# Patient Record
Sex: MALE | Race: BLACK OR AFRICAN AMERICAN | NOT HISPANIC OR LATINO | URBAN - METROPOLITAN AREA
[De-identification: names, ages, dates, MRNs, and addresses within clinical notes are randomized per-mention and may not be internally consistent; named-entity substitution may affect disease eponyms.]

---

## 2019-04-30 ENCOUNTER — INPATIENT (INPATIENT)
Facility: HOSPITAL | Age: 84
LOS: 6 days | Discharge: HOME | End: 2019-05-07
Attending: HOSPITALIST | Admitting: HOSPITALIST
Payer: MEDICARE

## 2019-04-30 VITALS
DIASTOLIC BLOOD PRESSURE: 72 MMHG | RESPIRATION RATE: 18 BRPM | OXYGEN SATURATION: 99 % | SYSTOLIC BLOOD PRESSURE: 109 MMHG | HEART RATE: 98 BPM | TEMPERATURE: 99 F

## 2019-04-30 DIAGNOSIS — Z29.9 ENCOUNTER FOR PROPHYLACTIC MEASURES, UNSPECIFIED: ICD-10-CM

## 2019-04-30 DIAGNOSIS — I63.9 CEREBRAL INFARCTION, UNSPECIFIED: ICD-10-CM

## 2019-04-30 DIAGNOSIS — K82.2 PERFORATION OF GALLBLADDER: ICD-10-CM

## 2019-04-30 DIAGNOSIS — K82.8 OTHER SPECIFIED DISEASES OF GALLBLADDER: ICD-10-CM

## 2019-04-30 DIAGNOSIS — I10 ESSENTIAL (PRIMARY) HYPERTENSION: ICD-10-CM

## 2019-04-30 DIAGNOSIS — K81.0 ACUTE CHOLECYSTITIS: ICD-10-CM

## 2019-04-30 LAB
ALBUMIN SERPL ELPH-MCNC: 3.4 G/DL — SIGNIFICANT CHANGE UP (ref 3.3–5)
ALP SERPL-CCNC: 59 U/L — SIGNIFICANT CHANGE UP (ref 40–120)
ALT FLD-CCNC: 25 U/L — SIGNIFICANT CHANGE UP (ref 4–41)
ANION GAP SERPL CALC-SCNC: 19 MMO/L — HIGH (ref 7–14)
ANISOCYTOSIS BLD QL: SLIGHT — SIGNIFICANT CHANGE UP
APPEARANCE UR: CLEAR — SIGNIFICANT CHANGE UP
APTT BLD: 25.1 SEC — LOW (ref 27.5–36.3)
AST SERPL-CCNC: 33 U/L — SIGNIFICANT CHANGE UP (ref 4–40)
BACTERIA # UR AUTO: NEGATIVE — SIGNIFICANT CHANGE UP
BASE EXCESS BLDV CALC-SCNC: -2.4 MMOL/L — SIGNIFICANT CHANGE UP
BASOPHILS # BLD AUTO: 0.02 K/UL — SIGNIFICANT CHANGE UP (ref 0–0.2)
BASOPHILS NFR BLD AUTO: 0.2 % — SIGNIFICANT CHANGE UP (ref 0–2)
BASOPHILS NFR SPEC: 0 % — SIGNIFICANT CHANGE UP (ref 0–2)
BILIRUB SERPL-MCNC: 1.7 MG/DL — HIGH (ref 0.2–1.2)
BILIRUB UR-MCNC: SIGNIFICANT CHANGE UP
BLASTS # FLD: 0 % — SIGNIFICANT CHANGE UP (ref 0–0)
BLD GP AB SCN SERPL QL: NEGATIVE — SIGNIFICANT CHANGE UP
BLOOD GAS VENOUS - CREATININE: 1.02 MG/DL — SIGNIFICANT CHANGE UP (ref 0.5–1.3)
BLOOD UR QL VISUAL: SIGNIFICANT CHANGE UP
BUN SERPL-MCNC: 31 MG/DL — HIGH (ref 7–23)
CALCIUM SERPL-MCNC: 9.3 MG/DL — SIGNIFICANT CHANGE UP (ref 8.4–10.5)
CHLORIDE BLDV-SCNC: 104 MMOL/L — SIGNIFICANT CHANGE UP (ref 96–108)
CHLORIDE SERPL-SCNC: 96 MMOL/L — LOW (ref 98–107)
CO2 SERPL-SCNC: 18 MMOL/L — LOW (ref 22–31)
COLOR SPEC: YELLOW — SIGNIFICANT CHANGE UP
CREAT SERPL-MCNC: 0.94 MG/DL — SIGNIFICANT CHANGE UP (ref 0.5–1.3)
EOSINOPHIL # BLD AUTO: 0 K/UL — SIGNIFICANT CHANGE UP (ref 0–0.5)
EOSINOPHIL NFR BLD AUTO: 0 % — SIGNIFICANT CHANGE UP (ref 0–6)
EOSINOPHIL NFR FLD: 0 % — SIGNIFICANT CHANGE UP (ref 0–6)
GAS PNL BLDV: 130 MMOL/L — LOW (ref 136–146)
GIANT PLATELETS BLD QL SMEAR: PRESENT — SIGNIFICANT CHANGE UP
GLUCOSE BLDV-MCNC: 188 — HIGH (ref 70–99)
GLUCOSE SERPL-MCNC: 191 MG/DL — HIGH (ref 70–99)
GLUCOSE UR-MCNC: NEGATIVE — SIGNIFICANT CHANGE UP
HCO3 BLDV-SCNC: 23 MMOL/L — SIGNIFICANT CHANGE UP (ref 20–27)
HCT VFR BLD CALC: 42.8 % — SIGNIFICANT CHANGE UP (ref 39–50)
HCT VFR BLDV CALC: 45.3 % — SIGNIFICANT CHANGE UP (ref 39–51)
HGB BLD-MCNC: 14.4 G/DL — SIGNIFICANT CHANGE UP (ref 13–17)
HGB BLDV-MCNC: 14.8 G/DL — SIGNIFICANT CHANGE UP (ref 13–17)
HYALINE CASTS # UR AUTO: HIGH
IMM GRANULOCYTES NFR BLD AUTO: 0.4 % — SIGNIFICANT CHANGE UP (ref 0–1.5)
INR BLD: 1.37 — HIGH (ref 0.88–1.17)
KETONES UR-MCNC: HIGH
LACTATE BLDV-MCNC: 2 MMOL/L — SIGNIFICANT CHANGE UP (ref 0.5–2)
LEUKOCYTE ESTERASE UR-ACNC: NEGATIVE — SIGNIFICANT CHANGE UP
LIDOCAIN IGE QN: 9.6 U/L — SIGNIFICANT CHANGE UP (ref 7–60)
LYMPHOCYTES # BLD AUTO: 0.45 K/UL — LOW (ref 1–3.3)
LYMPHOCYTES # BLD AUTO: 4 % — LOW (ref 13–44)
LYMPHOCYTES NFR SPEC AUTO: 0.9 % — LOW (ref 13–44)
MACROCYTES BLD QL: SLIGHT — SIGNIFICANT CHANGE UP
MCHC RBC-ENTMCNC: 30.4 PG — SIGNIFICANT CHANGE UP (ref 27–34)
MCHC RBC-ENTMCNC: 33.6 % — SIGNIFICANT CHANGE UP (ref 32–36)
MCV RBC AUTO: 90.5 FL — SIGNIFICANT CHANGE UP (ref 80–100)
METAMYELOCYTES # FLD: 1.8 % — HIGH (ref 0–1)
MONOCYTES # BLD AUTO: 1.05 K/UL — HIGH (ref 0–0.9)
MONOCYTES NFR BLD AUTO: 9.4 % — SIGNIFICANT CHANGE UP (ref 2–14)
MONOCYTES NFR BLD: 2.7 % — SIGNIFICANT CHANGE UP (ref 2–9)
MYELOCYTES NFR BLD: 0 % — SIGNIFICANT CHANGE UP (ref 0–0)
NEUTROPHIL AB SER-ACNC: 76.1 % — SIGNIFICANT CHANGE UP (ref 43–77)
NEUTROPHILS # BLD AUTO: 9.56 K/UL — HIGH (ref 1.8–7.4)
NEUTROPHILS NFR BLD AUTO: 86 % — HIGH (ref 43–77)
NEUTS BAND # BLD: 15 % — HIGH (ref 0–6)
NITRITE UR-MCNC: NEGATIVE — SIGNIFICANT CHANGE UP
NRBC # BLD: 1 /100WBC — SIGNIFICANT CHANGE UP
NRBC # FLD: 0 K/UL — SIGNIFICANT CHANGE UP (ref 0–0)
OTHER - HEMATOLOGY %: 0 — SIGNIFICANT CHANGE UP
OVALOCYTES BLD QL SMEAR: SLIGHT — SIGNIFICANT CHANGE UP
PCO2 BLDV: 30 MMHG — LOW (ref 41–51)
PH BLDV: 7.46 PH — HIGH (ref 7.32–7.43)
PH UR: 6.5 — SIGNIFICANT CHANGE UP (ref 5–8)
PLATELET # BLD AUTO: 247 K/UL — SIGNIFICANT CHANGE UP (ref 150–400)
PLATELET COUNT - ESTIMATE: NORMAL — SIGNIFICANT CHANGE UP
PMV BLD: 11.5 FL — SIGNIFICANT CHANGE UP (ref 7–13)
PO2 BLDV: 47 MMHG — HIGH (ref 35–40)
POIKILOCYTOSIS BLD QL AUTO: SLIGHT — SIGNIFICANT CHANGE UP
POLYCHROMASIA BLD QL SMEAR: SLIGHT — SIGNIFICANT CHANGE UP
POTASSIUM BLDV-SCNC: 3.8 MMOL/L — SIGNIFICANT CHANGE UP (ref 3.4–4.5)
POTASSIUM SERPL-MCNC: 3.8 MMOL/L — SIGNIFICANT CHANGE UP (ref 3.5–5.3)
POTASSIUM SERPL-SCNC: 3.8 MMOL/L — SIGNIFICANT CHANGE UP (ref 3.5–5.3)
PROMYELOCYTES # FLD: 0 % — SIGNIFICANT CHANGE UP (ref 0–0)
PROT SERPL-MCNC: 6.9 G/DL — SIGNIFICANT CHANGE UP (ref 6–8.3)
PROT UR-MCNC: 200 — HIGH
PROTHROM AB SERPL-ACNC: 15.3 SEC — HIGH (ref 9.8–13.1)
RBC # BLD: 4.73 M/UL — SIGNIFICANT CHANGE UP (ref 4.2–5.8)
RBC # FLD: 12.7 % — SIGNIFICANT CHANGE UP (ref 10.3–14.5)
RBC CASTS # UR COMP ASSIST: HIGH (ref 0–?)
REVIEW TO FOLLOW: YES — SIGNIFICANT CHANGE UP
RH IG SCN BLD-IMP: POSITIVE — SIGNIFICANT CHANGE UP
SAO2 % BLDV: 81.3 % — SIGNIFICANT CHANGE UP (ref 60–85)
SODIUM SERPL-SCNC: 133 MMOL/L — LOW (ref 135–145)
SP GR SPEC: > 1.04 — HIGH (ref 1–1.04)
SQUAMOUS # UR AUTO: SIGNIFICANT CHANGE UP
UROBILINOGEN FLD QL: HIGH
VARIANT LYMPHS # BLD: 3.5 % — SIGNIFICANT CHANGE UP
WBC # BLD: 11.13 K/UL — HIGH (ref 3.8–10.5)
WBC # FLD AUTO: 11.13 K/UL — HIGH (ref 3.8–10.5)
WBC UR QL: HIGH (ref 0–?)

## 2019-04-30 PROCEDURE — 76700 US EXAM ABDOM COMPLETE: CPT | Mod: 26

## 2019-04-30 PROCEDURE — 99223 1ST HOSP IP/OBS HIGH 75: CPT | Mod: GC,AI

## 2019-04-30 PROCEDURE — 74177 CT ABD & PELVIS W/CONTRAST: CPT | Mod: 26

## 2019-04-30 RX ORDER — HYDROMORPHONE HYDROCHLORIDE 2 MG/ML
0.25 INJECTION INTRAMUSCULAR; INTRAVENOUS; SUBCUTANEOUS EVERY 4 HOURS
Qty: 0 | Refills: 0 | Status: DISCONTINUED | OUTPATIENT
Start: 2019-04-30 | End: 2019-05-04

## 2019-04-30 RX ORDER — SODIUM CHLORIDE 9 MG/ML
500 INJECTION INTRAMUSCULAR; INTRAVENOUS; SUBCUTANEOUS ONCE
Qty: 0 | Refills: 0 | Status: COMPLETED | OUTPATIENT
Start: 2019-04-30 | End: 2019-04-30

## 2019-04-30 RX ORDER — PIPERACILLIN AND TAZOBACTAM 4; .5 G/20ML; G/20ML
3.38 INJECTION, POWDER, LYOPHILIZED, FOR SOLUTION INTRAVENOUS ONCE
Qty: 0 | Refills: 0 | Status: COMPLETED | OUTPATIENT
Start: 2019-04-30 | End: 2019-04-30

## 2019-04-30 RX ORDER — PIPERACILLIN AND TAZOBACTAM 4; .5 G/20ML; G/20ML
3.38 INJECTION, POWDER, LYOPHILIZED, FOR SOLUTION INTRAVENOUS EVERY 8 HOURS
Qty: 0 | Refills: 0 | Status: DISCONTINUED | OUTPATIENT
Start: 2019-04-30 | End: 2019-05-06

## 2019-04-30 RX ORDER — ATORVASTATIN CALCIUM 80 MG/1
10 TABLET, FILM COATED ORAL AT BEDTIME
Qty: 0 | Refills: 0 | Status: DISCONTINUED | OUTPATIENT
Start: 2019-04-30 | End: 2019-05-07

## 2019-04-30 RX ADMIN — PIPERACILLIN AND TAZOBACTAM 25 GRAM(S): 4; .5 INJECTION, POWDER, LYOPHILIZED, FOR SOLUTION INTRAVENOUS at 15:54

## 2019-04-30 RX ADMIN — SODIUM CHLORIDE 500 MILLILITER(S): 9 INJECTION INTRAMUSCULAR; INTRAVENOUS; SUBCUTANEOUS at 09:30

## 2019-04-30 RX ADMIN — SODIUM CHLORIDE 1000 MILLILITER(S): 9 INJECTION INTRAMUSCULAR; INTRAVENOUS; SUBCUTANEOUS at 07:21

## 2019-04-30 RX ADMIN — PIPERACILLIN AND TAZOBACTAM 200 GRAM(S): 4; .5 INJECTION, POWDER, LYOPHILIZED, FOR SOLUTION INTRAVENOUS at 09:18

## 2019-04-30 RX ADMIN — SODIUM CHLORIDE 500 MILLILITER(S): 9 INJECTION INTRAMUSCULAR; INTRAVENOUS; SUBCUTANEOUS at 14:24

## 2019-04-30 RX ADMIN — PIPERACILLIN AND TAZOBACTAM 3.38 GRAM(S): 4; .5 INJECTION, POWDER, LYOPHILIZED, FOR SOLUTION INTRAVENOUS at 01:00

## 2019-04-30 RX ADMIN — ATORVASTATIN CALCIUM 10 MILLIGRAM(S): 80 TABLET, FILM COATED ORAL at 22:40

## 2019-04-30 RX ADMIN — SODIUM CHLORIDE 500 MILLILITER(S): 9 INJECTION INTRAMUSCULAR; INTRAVENOUS; SUBCUTANEOUS at 09:32

## 2019-04-30 NOTE — ED ADULT TRIAGE NOTE - CHIEF COMPLAINT QUOTE
Pt st" I been vomiting and episode just alittle diarreah, but I have terrible pain in lower abd mostly rt side and seems worse when I lay down at night can't sleep going on for 4 days now." localizing pain to rt lower abd area. Pt st" I been vomiting and episode just alittle diarreah, but I have terrible pain in lower abd mostly rt side and seems worse when I lay down at night can't sleep going on for 4 days now." localizing pain to rt lower abd area. hx of htn, pacemaker/ on eloquis. denies bloody emesis. "Im just dry heaving now nothing is coming up."

## 2019-04-30 NOTE — H&P ADULT - PROBLEM SELECTOR PLAN 3
H/o CVA on Eliquis per daughter; suspect thromboembolic etiology  - EKG to check for afib (irregular HR on exam)  - hold Eliquis for possible IR biopsy

## 2019-04-30 NOTE — H&P ADULT - PROBLEM SELECTOR PLAN 1
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 Given likelihood of metastatic disease and pt currently well appearing, will hold off surgery and pursue IR biopsy/oncology workup  - surgery will continue to follow  - IR consulted for biopsy- GB vs omental mets

## 2019-04-30 NOTE — H&P ADULT - NSHPPHYSICALEXAM_GEN_ALL_CORE
General: WN/WD NAD, pleasant elderly man, [place]  Neurology: limited as pt is [place]; grossly no deficits  Eyes: PERRLA, EOMI  ENT/Neck: Neck supple, trachea midline, No JVD  Respiratory: CTA B/L, No wheezing, rales, rhonchi  CV: irregular, S1S2, no murmurs, rubs or gallops  Abdominal: Soft, ND, RUQ mildly TTP with guarding upon deep palpation, no rebound, +BS   Extremities: No edema, + peripheral pulses  Skin: No Rashes, Hematoma, Ecchymosis

## 2019-04-30 NOTE — ED ADULT NURSE NOTE - CHIEF COMPLAINT QUOTE
Pt st" I been vomiting and episode just alittle diarreah, but I have terrible pain in lower abd mostly rt side and seems worse when I lay down at night can't sleep going on for 4 days now." localizing pain to rt lower abd area. hx of htn, pacemaker/ on eloquis. denies bloody emesis. "Im just dry heaving now nothing is coming up."

## 2019-04-30 NOTE — ED PROVIDER NOTE - ATTENDING CONTRIBUTION TO CARE
Afebrile. Awake and Alert. Lungs CTA. Heart RRR. Abdomen soft, RLQ TTP, ND. CN II-XII grossly intact. Moves all extremities without lateralization.    CT a/p r/o appy  UA r/o UTI Afebrile. Awake and Alert. Lungs CTA. Heart RRR. Abdomen soft, RUQ>RLQ TTP, ND. CN II-XII grossly intact. Moves all extremities without lateralization.    RUQ US r/o cholecystitis  CT a/p r/o appy  UA r/o UTI

## 2019-04-30 NOTE — H&P ADULT - PROBLEM SELECTOR PLAN 5
VTE ppx- SCDs for now given possible GB perforation pending IR eval  Diet- keep NPO today pending IR eval  GOC- pt defers decisions to his daughter Mireille Delatorre [phone]. Daughter believes pt will want to be DNR/I but would want surgical/invasive tx/workup if indicated. Keep pt full code for now, daughter will clarify with pt and other family members today.

## 2019-04-30 NOTE — ED PROVIDER NOTE - OBJECTIVE STATEMENT
89M h/o HTN, hyperlipidemia,  CVA ( 10yrs ago without any residual effects ), CAD, pacemaker, p/w 3 days of RLQ pain, waxing and waning, worse at night. States he had nausea and vomiting x2 the first day. No blood. Reports decreased PO intake. Denies fevers, chills, chest pain. States pain is 5/10 now. Had a BM yesterday. Denies abdominal surgeries

## 2019-04-30 NOTE — ED PROVIDER NOTE - PHYSICAL EXAMINATION
CONSTITUTIONAL: NAD, awake, alert  HEAD: Normocephalic; atraumatic  ENMT: External appears normal; dry MM  CARD: Normal Sl, S2; no audible murmurs  RESP: Breathing comfortably on RA, normal wob, lungs ctab  ABD: Soft, non-distended; + RLQ tenderness  EXT: No edema, normal ROM in all four extremities  NEURO: aaox3, moving all extremities spontaneously

## 2019-04-30 NOTE — ED ADULT NURSE NOTE - NSIMPLEMENTINTERV_GEN_ALL_ED
Implemented All Universal Safety Interventions:  [place] to call system. Call bell, personal items and telephone within reach. Instruct patient to call for assistance. Room bathroom lighting operational. Non-slip footwear when patient is off stretcher. Physically safe environment: no spills, clutter or unnecessary equipment. Stretcher in lowest position, wheels locked, appropriate side rails in place.

## 2019-04-30 NOTE — H&P ADULT - NSICDXPASTMEDICALHX_GEN_ALL_CORE_FT
PAST MEDICAL HISTORY:  Bradycardia     CVA (Cerebral Infarction) No residual signs on exam    GERD (Gastroesophageal Reflux Disease)     History of Carotid Stenosis     Hypercholesteremia     Hypertension

## 2019-04-30 NOTE — H&P ADULT - NSHPSOCIALHISTORY_GEN_ALL_CORE
Remote history of smoking (greater than 60 years ago). Occassionally drinks wine. Lives with his daughter. Remote history of smoking (greater than 60 years ago). Occasionally drinks wine. Lives with his daughter. [place]. Independent in most ADLs; travels to [place] by himself on occasion.

## 2019-04-30 NOTE — ED ADULT NURSE REASSESSMENT NOTE - NS ED NURSE REASSESS COMMENT FT1
pt denies pain. IV abx infusing. pt is awaiting a bed. awaiting further orders Will continue to monitor the pt safety maintained

## 2019-04-30 NOTE — ED ADULT NURSE NOTE - OBJECTIVE STATEMENT
Received pt to room 14. pt is alert and oriented x 4, ambulatory ,c/o abdominal pain and nausea and diarrhea since Sunday. Daughter is at bedside. pt is not nauseous or vomiting now. but still has pain. Pt is able to ambulate without assistance as per daughter. Pt appears to be comfortable at this time. labs sent as per order. 20 G Sl placed in the left forearm and IVF infusing as per order. Pt awaits CT and ultrasound.

## 2019-04-30 NOTE — ED PROVIDER NOTE - PMH
Bradycardia    CVA (Cerebral Infarction)  No residual signs on exam  GERD (Gastroesophageal Reflux Disease)    History of Carotid Stenosis    Hypercholesteremia    Hypertension

## 2019-04-30 NOTE — H&P ADULT - PROBLEM SELECTOR PLAN 2
RUQ concerning for acute cholecystitis. Afebrile and mild leukocytosis, though with 15% bandemia, lactate 2.0  - Discussed at length with surgery- given likelihood of metastatic disease and current well appearance, will defer surgical management for now. Will continue to follow  - IVF bolus, repeat lactate in AM  - will treat empirically for acute efren with zosyn (started 4/30)  - low threshold to notify surgery if pt having worsening pain, hemodynamic instability

## 2019-04-30 NOTE — H&P ADULT - NSHPLABSRESULTS_GEN_ALL_CORE
14.4   11.13 )-----------( 247      ( 30 Apr 2019 07:20 )             42.8       04-30    133<L>  |  96<L>  |  31<H>  ----------------------------<  191<H>  3.8   |  18<L>  |  0.94    Ca    9.3      30 Apr 2019 07:20    TPro  6.9  /  Alb  3.4  /  TBili  1.7<H>  /  DBili  x   /  AST  33  /  ALT  25  /  AlkPhos  59  04-30              Urinalysis Basic - ( 30 Apr 2019 09:38 )    Color: YELLOW / Appearance: CLEAR / SG: > 1.040 / pH: 6.5  Gluc: NEGATIVE / Ketone: MODERATE  / Bili: TRACE / Urobili: SMALL   Blood: SMALL / Protein: 200 / Nitrite: NEGATIVE   Leuk Esterase: NEGATIVE / RBC: 11-25 / WBC 11-25   Sq Epi: FEW / Non Sq Epi: x / Bacteria: NEGATIVE        PT/INR - ( 30 Apr 2019 09:30 )   PT: 15.3 SEC;   INR: 1.37          PTT - ( 30 Apr 2019 09:30 )  PTT:25.1 SEC    Lactate Trend            CAPILLARY BLOOD GLUCOSE      POCT Blood Glucose.: 173 mg/dL (30 Apr 2019 06:13)

## 2019-04-30 NOTE — H&P ADULT - HISTORY OF PRESENT ILLNESS
HPI: 89 yo man with HTN, HLD, prior CVA, sinus node dysfunction s/p PPM presenting from home with RUQ abdominal pain. He developed nausea two days prior to presentation, then subsequently developed RUQ pain that worsened overnight. He denies fever, chills or diarrhea. He has never had pain like this previously. He does not have an appetite today, but usually he has a good appetite and denies recent weight loss. His last colonoscopy was greater than a decade ago.     In the ED, VS: Tm 98.6F, HR 90-100s, BP 91/55, SpO2 98% on RA. Labs notable for mild leukocytosis 11, bands 15%, lactate 2.0, pH 7.46, bicarb 18. Received 500cc bolus x2. Pt underwent CT A/P with IV contrast, which showed findings suspicious for gall bladder mass with signs of perforation, extensive necrotic RP lymphadenopathy, omental nodularity, and acute cholecystitis. RUQ also showed findings suspicious for acute cholecystitis. Surgery was consulted and recommended monitoring off antibiotics 89 yo man with HTN, HLD, prior CVA (likely afib as pt is on Eliquis 2.5), sinus node dysfunction s/p PPM presenting from home with RUQ abdominal pain. Pt is very [place] (hearing aids currently removed), and defers interview to his daughter, Mireille Delatorre [phone], from whom history was obtained over the phone. Pt developed nausea two days prior to presentation, then subsequently developed RUQ pain that worsened overnight. Denies fever, chills or diarrhea. He has never had pain like this previously. He does not have an appetite today, but usually he has a good appetite and denies recent weight loss. His last colonoscopy was greater than a decade ago. Pt lives with his daughter Mireille and is very independent in his ADLs. He travels to [place] by bus by himself. Daughter believes pt would want to be a DNR/I while on the floor, but would rescind it temporarily for surgery/procedure, which they would agree to if indicated; she will clarify code status with him and family today.    In the ED, VS: Tm 98.6F, HR 90-100s, BP 91/55, SpO2 98% on RA. Labs notable for mild leukocytosis 11, bands 15%, lactate 2.0, pH 7.46, bicarb 18. Received 500cc bolus x2 and zosyn x1. Pt underwent CT A/P with IV contrast, which showed findings suspicious for gall bladder mass with signs of perforation, extensive necrotic RP lymphadenopathy, omental nodularity, and acute cholecystitis. RUQ also showed findings suspicious for acute cholecystitis. Surgery was consulted and recommended monitoring off antibiotics, and less invasive oncology workup.

## 2019-04-30 NOTE — ED PROVIDER NOTE - NS ED ROS FT
General: denies fever, chills  CV: denies chest pain, palpitations  Resp: denies difficulty breathing, cough  Abdominal: +nausea, +vomiting, +abdominal pain  MSK: denies muscle aches, bony pain, leg pain, leg swelling  Neuro: denies headaches, numbness, tingling, dizziness, lightheadedness.  Skin: denies rashes, cuts, bruises  Hematologic: denies unexplained bruises

## 2019-04-30 NOTE — H&P ADULT - NSICDXPASTSURGICALHX_GEN_ALL_CORE_FT
PAST SURGICAL HISTORY:  History of Knee Surgery Left knee in 1970's    IH (Inguinal Hernia) Left    Pacemaker     Pacemaker

## 2019-04-30 NOTE — CHART NOTE - NSCHARTNOTEFT_GEN_A_CORE
IR consulted for biopsy of gall bladder mass vs omental carcinomatosis per surgery's recommendations. Discussed with IR resident Dr. Nick Madera, who reviewed images with IR attending and advised that the imaging shows omental thickening without an optimal target for biopsy. There is a likely necrotic lymphadenopathy in series 2 images 41-43 that may be amenable to endoscopic ultrasound-guided biopsy, recommended advanced GI eval. Will discuss with advanced GI.     Juan David Duffy MD  Internal medicine resident- team 1  [id number] IR consulted for biopsy of gall bladder mass vs omental carcinomatosis per surgery's recommendations. Discussed with IR resident Dr. Nick Madera, who reviewed images with IR attending and advised that the imaging shows omental thickening without an optimal target for biopsy. There is a likely necrotic lymphadenopathy in series 2 images 41-43 that may be amenable to endoscopic ultrasound-guided biopsy, recommended advanced GI eval. Advanced GI consult placed.     Juan David Duffy MD  Internal medicine resident- team 1  [id number]

## 2019-04-30 NOTE — H&P ADULT - NEGATIVE GASTROINTESTINAL SYMPTOMS
no hematochezia/no constipation/no vomiting/no melena/no jaundice/no diarrhea/no change in bowel habits

## 2019-04-30 NOTE — ED PROVIDER NOTE - CLINICAL SUMMARY MEDICAL DECISION MAKING FREE TEXT BOX
89M w/ RLQ pain and tenderness, CT, IVF, labs, denies pain medications right now. Considered ischemic bowel but patient is in 5/10 pain, will check lactate on vbg. no h/o abdominal surgeries previously, low suspicion for SBO

## 2019-05-01 ENCOUNTER — TRANSCRIPTION ENCOUNTER (OUTPATIENT)
Age: 84
End: 2019-05-01

## 2019-05-01 LAB
ALBUMIN SERPL ELPH-MCNC: 2.7 G/DL — LOW (ref 3.3–5)
ALP SERPL-CCNC: 58 U/L — SIGNIFICANT CHANGE UP (ref 40–120)
ALT FLD-CCNC: 41 U/L — SIGNIFICANT CHANGE UP (ref 4–41)
ANION GAP SERPL CALC-SCNC: 18 MMO/L — HIGH (ref 7–14)
APTT BLD: 27.8 SEC — SIGNIFICANT CHANGE UP (ref 27.5–36.3)
AST SERPL-CCNC: 55 U/L — HIGH (ref 4–40)
BASOPHILS # BLD AUTO: 0.03 K/UL — SIGNIFICANT CHANGE UP (ref 0–0.2)
BASOPHILS NFR BLD AUTO: 0.2 % — SIGNIFICANT CHANGE UP (ref 0–2)
BILIRUB SERPL-MCNC: 1.1 MG/DL — SIGNIFICANT CHANGE UP (ref 0.2–1.2)
BUN SERPL-MCNC: 34 MG/DL — HIGH (ref 7–23)
CALCIUM SERPL-MCNC: 8.5 MG/DL — SIGNIFICANT CHANGE UP (ref 8.4–10.5)
CANCER AG125 SERPL-ACNC: 19 U/ML — SIGNIFICANT CHANGE UP
CANCER AG19-9 SERPL-ACNC: 27 U/ML — SIGNIFICANT CHANGE UP
CANCER AG27-29 SERPL-ACNC: 17.7 U/ML — SIGNIFICANT CHANGE UP (ref 0–38.6)
CEA SERPL-MCNC: 2 NG/ML — SIGNIFICANT CHANGE UP (ref 1–3.8)
CHLORIDE SERPL-SCNC: 109 MMOL/L — HIGH (ref 98–107)
CO2 SERPL-SCNC: 17 MMOL/L — LOW (ref 22–31)
CREAT SERPL-MCNC: 1.02 MG/DL — SIGNIFICANT CHANGE UP (ref 0.5–1.3)
EOSINOPHIL # BLD AUTO: 0.01 K/UL — SIGNIFICANT CHANGE UP (ref 0–0.5)
EOSINOPHIL NFR BLD AUTO: 0.1 % — SIGNIFICANT CHANGE UP (ref 0–6)
GLUCOSE SERPL-MCNC: 139 MG/DL — HIGH (ref 70–99)
HCT VFR BLD CALC: 40.8 % — SIGNIFICANT CHANGE UP (ref 39–50)
HGB BLD-MCNC: 13.5 G/DL — SIGNIFICANT CHANGE UP (ref 13–17)
IMM GRANULOCYTES NFR BLD AUTO: 0.6 % — SIGNIFICANT CHANGE UP (ref 0–1.5)
INR BLD: 1.3 — HIGH (ref 0.88–1.17)
LACTATE SERPL-SCNC: 1.1 MMOL/L — SIGNIFICANT CHANGE UP (ref 0.5–2)
LYMPHOCYTES # BLD AUTO: 0.81 K/UL — LOW (ref 1–3.3)
LYMPHOCYTES # BLD AUTO: 5.9 % — LOW (ref 13–44)
MCHC RBC-ENTMCNC: 30.6 PG — SIGNIFICANT CHANGE UP (ref 27–34)
MCHC RBC-ENTMCNC: 33.1 % — SIGNIFICANT CHANGE UP (ref 32–36)
MCV RBC AUTO: 92.5 FL — SIGNIFICANT CHANGE UP (ref 80–100)
MONOCYTES # BLD AUTO: 1.55 K/UL — HIGH (ref 0–0.9)
MONOCYTES NFR BLD AUTO: 11.2 % — SIGNIFICANT CHANGE UP (ref 2–14)
NEUTROPHILS # BLD AUTO: 11.33 K/UL — HIGH (ref 1.8–7.4)
NEUTROPHILS NFR BLD AUTO: 82 % — HIGH (ref 43–77)
NRBC # FLD: 0 K/UL — SIGNIFICANT CHANGE UP (ref 0–0)
PLATELET # BLD AUTO: 282 K/UL — SIGNIFICANT CHANGE UP (ref 150–400)
PMV BLD: 11.8 FL — SIGNIFICANT CHANGE UP (ref 7–13)
POTASSIUM SERPL-MCNC: 3.7 MMOL/L — SIGNIFICANT CHANGE UP (ref 3.5–5.3)
POTASSIUM SERPL-SCNC: 3.7 MMOL/L — SIGNIFICANT CHANGE UP (ref 3.5–5.3)
PROT SERPL-MCNC: 6 G/DL — SIGNIFICANT CHANGE UP (ref 6–8.3)
PROTHROM AB SERPL-ACNC: 14.9 SEC — HIGH (ref 9.8–13.1)
RBC # BLD: 4.41 M/UL — SIGNIFICANT CHANGE UP (ref 4.2–5.8)
RBC # FLD: 12.9 % — SIGNIFICANT CHANGE UP (ref 10.3–14.5)
SODIUM SERPL-SCNC: 144 MMOL/L — SIGNIFICANT CHANGE UP (ref 135–145)
SPECIMEN SOURCE: SIGNIFICANT CHANGE UP
WBC # BLD: 13.81 K/UL — HIGH (ref 3.8–10.5)
WBC # FLD AUTO: 13.81 K/UL — HIGH (ref 3.8–10.5)

## 2019-05-01 PROCEDURE — 99497 ADVNCD CARE PLAN 30 MIN: CPT

## 2019-05-01 PROCEDURE — 99233 SBSQ HOSP IP/OBS HIGH 50: CPT | Mod: GC

## 2019-05-01 RX ADMIN — HYDROMORPHONE HYDROCHLORIDE 0.25 MILLIGRAM(S): 2 INJECTION INTRAMUSCULAR; INTRAVENOUS; SUBCUTANEOUS at 08:47

## 2019-05-01 RX ADMIN — PIPERACILLIN AND TAZOBACTAM 25 GRAM(S): 4; .5 INJECTION, POWDER, LYOPHILIZED, FOR SOLUTION INTRAVENOUS at 00:46

## 2019-05-01 RX ADMIN — HYDROMORPHONE HYDROCHLORIDE 0.25 MILLIGRAM(S): 2 INJECTION INTRAMUSCULAR; INTRAVENOUS; SUBCUTANEOUS at 08:32

## 2019-05-01 RX ADMIN — ATORVASTATIN CALCIUM 10 MILLIGRAM(S): 80 TABLET, FILM COATED ORAL at 21:49

## 2019-05-01 RX ADMIN — PIPERACILLIN AND TAZOBACTAM 25 GRAM(S): 4; .5 INJECTION, POWDER, LYOPHILIZED, FOR SOLUTION INTRAVENOUS at 08:32

## 2019-05-01 RX ADMIN — PIPERACILLIN AND TAZOBACTAM 25 GRAM(S): 4; .5 INJECTION, POWDER, LYOPHILIZED, FOR SOLUTION INTRAVENOUS at 16:17

## 2019-05-01 NOTE — PROGRESS NOTE ADULT - PROBLEM SELECTOR PLAN 1
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 Given likelihood of metastatic disease and pt currently well appearing, will hold off surgery and pursue IR biopsy/oncology workup  - surgery will continue to follow  - IR consulted for biopsy and recommends advanced GI eval for a likely necrotic lymphadenopathy in series 2 images 41-43 that may be amenable to endoscopic ultrasound-guided biopsy  - advanced GI consult placed

## 2019-05-01 NOTE — DISCHARGE NOTE PROVIDER - CARE PROVIDERS DIRECT ADDRESSES
,DirectAddress_Unknown ,DirectAddress_Unknown,[email or web address]

## 2019-05-01 NOTE — DISCHARGE NOTE PROVIDER - CARE PROVIDER_API CALL
Efren Johnston)  Critical Care Medicine; Internal Medicine; Pulmonary Disease  [address] [place] [address]  Phone: [phone]  Fax: [phone]  Follow Up Time: 2 weeks Efren Johnston)  Critical Care Medicine; Internal Medicine; Pulmonary Disease  [address] [place] [address]  Phone: [phone]  Fax: [phone]  Follow Up Time: 2 weeks    Guevara Tamayo)  Diagnostic Radiology; VascularIntervent Radiology  [address] [place] [address]  Phone: [phone]  Fax: [phone]  Follow Up Time: Routine

## 2019-05-01 NOTE — DISCHARGE NOTE PROVIDER - INSTRUCTIONS
No restrictions, please eat a soft diet if you do not have dentures. With dentures, you can eat regular food.

## 2019-05-01 NOTE — PROGRESS NOTE ADULT - PROBLEM SELECTOR PLAN 3
H/o CVA on Eliquis per daughter; suspect thromboembolic etiology  - EKG to check for afib (irregular HR on exam)  - hold Eliquis for possible biopsy

## 2019-05-01 NOTE — PROGRESS NOTE ADULT - ASSESSMENT
89 yo man in otherwise good health presenting with nausea, RUQ abdominal pain and CT findings concerning for a possible perforated gallbladder neoplasm. Patient well-appearing, without evidence of sepsis.     - No surgical intervention recommended  - IR not planning procedure  - Defer further workup to primary team    [id number]  B Team Surgery

## 2019-05-01 NOTE — DISCHARGE NOTE PROVIDER - PROVIDER TOKENS
PROVIDER:[TOKEN:[[id number]],FOLLOWUP:[2 weeks]] PROVIDER:[TOKEN:[[id number]],FOLLOWUP:[2 weeks]],PROVIDER:[TOKEN:[[id number]],FOLLOWUP:[Routine]]

## 2019-05-01 NOTE — PROGRESS NOTE ADULT - SUBJECTIVE AND OBJECTIVE BOX
CHIEF COMPLAINT: Patient is a 89y old  Male who presents with a chief complaint of RUQ pain (30 Apr 2019 13:23)      SUBJECTIVE / OVERNIGHT EVENTS: No acute events overnight. Pt seen and examined at bedside.      MEDICATIONS:  MEDICATIONS  (STANDING):  atorvastatin 10 milliGRAM(s) Oral at bedtime  piperacillin/tazobactam IVPB. 3.375 Gram(s) IV Intermittent every 8 hours    MEDICATIONS  (PRN):  HYDROmorphone  Injectable 0.25 milliGRAM(s) IV Push every 4 hours PRN Severe Pain (7 - 10)        OBJECTIVE:  Vital Signs Last 24 Hrs  T(C): 36.6 (01 May 2019 05:18), Max: 36.8 (30 Apr 2019 10:59)  T(F): 97.8 (01 May 2019 05:18), Max: 98.3 (30 Apr 2019 12:42)  HR: 83 (01 May 2019 05:18) (83 - 100)  BP: 108/55 (01 May 2019 05:18) (91/55 - 108/55)  BP(mean): --  RR: 18 (01 May 2019 05:18) (16 - 18)  SpO2: 96% (01 May 2019 05:18) (94% - 100%)  CAPILLARY BLOOD GLUCOSE      POCT Blood Glucose.: 136 mg/dL (30 Apr 2019 15:20)    I&O's Summary        PHYSICAL EXAM:  GENERAL: NAD, pleasant elderly man, [place]  HEENT: EOMI, PERRLA, conjunctiva and sclera clear  NECK: Supple, No JVD  CHEST/LUNG: CTABL  HEART: RRR; S1 S2, No murmurs, rubs, or gallops  ABDOMEN: Soft, mildly TTP RUQ, Nondistended; BS x4   EXTREMITIES:  2+ Pulses, No edema  NEUROLOGY: AAOx3  SKIN: No rashes or lesions    LABS:                        14.4   11.13 )-----------( 247      ( 30 Apr 2019 07:20 )             42.8     04-30    133<L>  |  96<L>  |  31<H>  ----------------------------<  191<H>  3.8   |  18<L>  |  0.94    Ca    9.3      30 Apr 2019 07:20    TPro  6.9  /  Alb  3.4  /  TBili  1.7<H>  /  DBili  x   /  AST  33  /  ALT  25  /  AlkPhos  59  04-30    PT/INR - ( 30 Apr 2019 09:30 )   PT: 15.3 SEC;   INR: 1.37          PTT - ( 30 Apr 2019 09:30 )  PTT:25.1 SEC      Urinalysis Basic - ( 30 Apr 2019 09:38 )    Color: YELLOW / Appearance: CLEAR / SG: > 1.040 / pH: 6.5  Gluc: NEGATIVE / Ketone: MODERATE  / Bili: TRACE / Urobili: SMALL   Blood: SMALL / Protein: 200 / Nitrite: NEGATIVE   Leuk Esterase: NEGATIVE / RBC: 11-25 / WBC 11-25   Sq Epi: FEW / Non Sq Epi: x / Bacteria: NEGATIVE CHIEF COMPLAINT: Patient is a 89y old  Male who presents with a chief complaint of RUQ pain (30 Apr 2019 13:23)      SUBJECTIVE / OVERNIGHT EVENTS: No acute events overnight. Pt seen and examined at bedside. Pt still does not have his hearing aids- daughter to  bring them in today. Endorses pain-- I advised pt by writing to ask the nurse for prn pain meds if in pain.       MEDICATIONS:  MEDICATIONS  (STANDING):  atorvastatin 10 milliGRAM(s) Oral at bedtime  piperacillin/tazobactam IVPB. 3.375 Gram(s) IV Intermittent every 8 hours    MEDICATIONS  (PRN):  HYDROmorphone  Injectable 0.25 milliGRAM(s) IV Push every 4 hours PRN Severe Pain (7 - 10)        OBJECTIVE:  Vital Signs Last 24 Hrs  T(C): 36.6 (01 May 2019 05:18), Max: 36.8 (30 Apr 2019 10:59)  T(F): 97.8 (01 May 2019 05:18), Max: 98.3 (30 Apr 2019 12:42)  HR: 83 (01 May 2019 05:18) (83 - 100)  BP: 108/55 (01 May 2019 05:18) (91/55 - 108/55)  BP(mean): --  RR: 18 (01 May 2019 05:18) (16 - 18)  SpO2: 96% (01 May 2019 05:18) (94% - 100%)  CAPILLARY BLOOD GLUCOSE      POCT Blood Glucose.: 136 mg/dL (30 Apr 2019 15:20)    I&O's Summary        PHYSICAL EXAM:  GENERAL: NAD, pleasant elderly man, [place]  HEENT: EOMI, PERRLA, conjunctiva and sclera clear  NECK: Supple, No JVD  CHEST/LUNG: CTABL  HEART: RRR; S1 S2, No murmurs, rubs, or gallops  ABDOMEN: Soft, TTP RUQ, no rebound, Nondistended; BS x4   EXTREMITIES:  2+ Pulses, No edema  NEUROLOGY: AAOx3  SKIN: No rashes or lesions    LABS:                        14.4   11.13 )-----------( 247      ( 30 Apr 2019 07:20 )             42.8     04-30    133<L>  |  96<L>  |  31<H>  ----------------------------<  191<H>  3.8   |  18<L>  |  0.94    Ca    9.3      30 Apr 2019 07:20    TPro  6.9  /  Alb  3.4  /  TBili  1.7<H>  /  DBili  x   /  AST  33  /  ALT  25  /  AlkPhos  59  04-30    PT/INR - ( 30 Apr 2019 09:30 )   PT: 15.3 SEC;   INR: 1.37          PTT - ( 30 Apr 2019 09:30 )  PTT:25.1 SEC      Urinalysis Basic - ( 30 Apr 2019 09:38 )    Color: YELLOW / Appearance: CLEAR / SG: > 1.040 / pH: 6.5  Gluc: NEGATIVE / Ketone: MODERATE  / Bili: TRACE / Urobili: SMALL   Blood: SMALL / Protein: 200 / Nitrite: NEGATIVE   Leuk Esterase: NEGATIVE / RBC: 11-25 / WBC 11-25   Sq Epi: FEW / Non Sq Epi: x / Bacteria: NEGATIVE

## 2019-05-01 NOTE — GOALS OF CARE CONVERSATION - PERSONAL ADVANCE DIRECTIVE - TREATMENT GUIDELINE COMMENT
All medical and surgical interventions as deemed appropriate/indicated. DNR/I, but if surgery is indicated would temporarily rescind the DNR/I for surgery. All medical interventions as deemed appropriate/indicated, including IV fluids and antibiotics. DNR/I. Will not pursue more invasive workup such as surgery, or toxic medical tx including chemotherapy if it would not be curative. Goal is maintaining an independent level of functioning and quality of life.

## 2019-05-01 NOTE — DISCHARGE NOTE PROVIDER - NSDCCPCAREPLAN_GEN_ALL_CORE_FT
PRINCIPAL DISCHARGE DIAGNOSIS  Diagnosis: Gallbladder mass  Assessment and Plan of Treatment: You have a gallbladder mass with perforation (leaking), which is suspicious for cancer. This mass and leaking is likely to cause worsening infection or pain, and the hospice care network is available 24/7 for assistance with symptoms. Please contact the hospice care network if needed for worsening symptoms, in order to maintain your comfort. PRINCIPAL DISCHARGE DIAGNOSIS  Diagnosis: Gallbladder mass  Assessment and Plan of Treatment: You have a gallbladder mass with perforation (leaking), which is suspicious for cancer. This mass and leaking is likely to cause worsening infection or pain, and the hospice care network is available 24/7 for assistance with symptoms. Please contact the hospice care network if needed for worsening symptoms, in order to maintain your comfort.  For the biliary drain, please change dressing (regular gauze or ones specially designed for a drain) daily. If you note blood or fluid on the gauze that concerns you, please call your nurse for help. Empty the bag daily, and more frequently as needed if the bag is full. Please have the drain exchanged no more than 3 months from placement (exchange by 8/3/19) at which point they may consider removing the drain. You will likely need to come back to the hospital to speak with interventional radiology to coordinate the drain change.  The family was trained on how to care for the biliary drain.  Please take pain medication as needed if you have abdominal pain.

## 2019-05-01 NOTE — DISCHARGE NOTE PROVIDER - HOSPITAL COURSE
89 yo man with HTN, HLD, prior CVA (likely afib as pt is on Eliquis 2.5; no residual deficits), sinus node dysfunction s/p PPM presenting from home with RUQ abdominal pain.        In the ED, VS: Tm 98.6F, HR 90-100s, BP 91/55, SpO2 98% on RA. Labs notable for mild leukocytosis 11, bands 15%, lactate 2.0, pH 7.46, bicarb 18. Received 500cc bolus x2 and zosyn x1. Pt underwent CT A/P with IV contrast, which showed findings suspicious for gall bladder mass with signs of perforation, extensive necrotic RP lymphadenopathy, omental nodularity, and acute cholecystitis. RUQ also showed findings suspicious for acute cholecystitis. Surgery was consulted and recommended monitoring off antibiotics, and less invasive oncology workup given the likelihood of metastatic disease. IR was also consulted and recommended endoscopic biopsy. After discussing the likely diagnosis of metastatic cancer. pt and his family opted for hospice eval. Pt's goal was to remain independent for the remainder of his life and to maintain high quality of life. Referred to hospice care network. Pt made himself DNR/I. 89 yo man with HTN, HLD, prior CVA (likely afib as pt is on Eliquis 2.5; no residual deficits), sinus node dysfunction s/p PPM presenting from home with RUQ abdominal pain.        In the ED, VS: Tm 98.6F, HR 90-100s, BP 91/55, SpO2 98% on RA. Labs notable for mild leukocytosis 11, bands 15%, lactate 2.0, pH 7.46, bicarb 18. Received 500cc bolus x2 and zosyn x1. Pt underwent CT A/P with IV contrast, which showed findings suspicious for gall bladder mass with signs of perforation, extensive necrotic RP lymphadenopathy, omental nodularity, and acute cholecystitis. RUQ also showed findings suspicious for acute cholecystitis. Surgery was consulted and recommended monitoring off antibiotics, and less invasive oncology workup given the likelihood of metastatic disease. IR was also consulted and recommended endoscopic biopsy. After discussing the likely diagnosis of metastatic cancer. pt and his family opted for hospice eval. Pt's goal was to remain independent for the remainder of his life and to maintain high quality of life. Referred to hospice care network and after discussions pt was planned for home hospice. Pt made himself DNR/I. Discussed risks and benefits of eliquis as well as a statin and patient deferred decision to family and medical team. 89 yo man with HTN, HLD, prior CVA (likely afib as pt is on Eliquis 2.5; no residual deficits), sinus node dysfunction s/p PPM presenting from home with RUQ abdominal pain.        In the ED, VS: Tm 98.6F, HR 90-100s, BP 91/55, SpO2 98% on RA. Labs notable for mild leukocytosis 11, bands 15%, lactate 2.0, pH 7.46, bicarb 18. Received 500cc bolus x2 and zosyn x1. Pt underwent CT A/P with IV contrast, which showed findings suspicious for gall bladder mass with signs of perforation, extensive necrotic RP lymphadenopathy, omental nodularity, and acute cholecystitis. RUQ also showed findings suspicious for acute cholecystitis. Surgery was consulted and recommended antibiotics, and less invasive oncology workup given the likelihood of metastatic disease. IR was also consulted and recommended endoscopic biopsy. After discussing the likely diagnosis of metastatic cancer. pt and his family opted for hospice eval. Pt's goal was to remain independent for the remainder of his life and to maintain high quality of life. Referred to hospice care network and after discussions pt was planned for home hospice. Pt made himself DNR/I. Discussed risks and benefits of eliquis as well as a statin and patient deferred decision to family and medical team. will continue with these medications for now. Patient underwent IR-guided cholecystostomy tube placement for possible acute cholecystitis as per ID recommendations. He will complete 13 more days of abx with Augmentin upon discharge as per ID recommendations. He should f/u with IR in 3 months or as needed for evaluation of cholecystotomy tube. 89 yo man with HTN, HLD, prior CVA (likely afib as pt is on Eliquis 2.5; no residual deficits), sinus node dysfunction s/p PPM presenting from home with RUQ abdominal pain.        In the ED, VS: Tm 98.6F, HR 90-100s, BP 91/55, SpO2 98% on RA. Labs notable for mild leukocytosis 11, bands 15%, lactate 2.0, pH 7.46, bicarb 18. Received 500cc bolus x2 and zosyn x1. Pt underwent CT A/P with IV contrast, which showed findings suspicious for gall bladder mass with signs of perforation, extensive necrotic RP lymphadenopathy, omental nodularity, and acute cholecystitis. RUQ also showed findings suspicious for acute cholecystitis. Surgery was consulted and recommended antibiotics, and less invasive oncology workup given the likelihood of metastatic disease. IR was also consulted and recommended endoscopic biopsy. After discussing the likely diagnosis of metastatic cancer. pt and his family opted for hospice eval. Pt's goal was to remain independent for the remainder of his life and to maintain high quality of life. Referred to hospice care network and after discussions pt was planned for home hospice. Pt made himself DNR/I. Discussed risks and benefits of eliquis as well as a statin and patient deferred decision to family and medical team. will continue with these medications for now. Patient underwent IR-guided cholecystostomy tube placement for possible acute cholecystitis as per ID recommendations. He will complete 13 more days of abx with Augmentin upon discharge as per ID recommendations. He should f/u with IR in 3 months or as needed for evaluation of cholecystotomy tube and possible removal.

## 2019-05-01 NOTE — PROGRESS NOTE ADULT - SUBJECTIVE AND OBJECTIVE BOX
Pt seen and examined. No acute interval events.    Vital Signs (24 Hrs):  T(C): 36.6 (05-01-19 @ 05:18), Max: 36.8 (04-30-19 @ 10:59)  HR: 78 (05-01-19 @ 08:30) (78 - 97)  BP: 114/96 (05-01-19 @ 08:30) (95/51 - 114/96)  RR: 18 (05-01-19 @ 05:18) (16 - 18)  SpO2: 96% (05-01-19 @ 05:18) (94% - 100%)  Wt(kg): --  Daily Height in cm: 165.1 (30 Apr 2019 19:33)    Daily     I&O's Summary    General: well developed, well nourished, NAD  Neuro: alert and oriented, no focal deficits, moves all extremities spontaneously  HEENT: NCAT, EOMI, anicteric, mucosa moist. Hard of hearing.   Respiratory: airway patent, respirations unlabored  CVS: regular rate and rhythm  Abdomen: soft, moderate RUQ tenderness, nondistended. No abdominal scars.   Extremities: no edema, sensation and movement grossly intact  Skin: warm, dry, appropriate color                          13.5   13.81 )-----------( 282      ( 01 May 2019 06:30 )             40.8       05-01    144  |  109<H>  |  34<H>  ----------------------------<  139<H>  3.7   |  17<L>  |  1.02    Ca    8.5      01 May 2019 06:30    TPro  6.0  /  Alb  2.7<L>  /  TBili  1.1  /  DBili  x   /  AST  55<H>  /  ALT  41  /  AlkPhos  58  05-01      PT/INR - ( 01 May 2019 06:30 )   PT: 14.9 SEC;   INR: 1.30          PTT - ( 01 May 2019 06:30 )  PTT:27.8 SEC              Urinalysis Basic - ( 30 Apr 2019 09:38 )    Color: YELLOW / Appearance: CLEAR / SG: > 1.040 / pH: 6.5  Gluc: NEGATIVE / Ketone: MODERATE  / Bili: TRACE / Urobili: SMALL   Blood: SMALL / Protein: 200 / Nitrite: NEGATIVE   Leuk Esterase: NEGATIVE / RBC: 11-25 / WBC 11-25   Sq Epi: FEW / Non Sq Epi: x / Bacteria: NEGATIVE

## 2019-05-01 NOTE — GOALS OF CARE CONVERSATION - PERSONAL ADVANCE DIRECTIVE - CONVERSATION DETAILS
[facility]: Spoke with dtr. ,Mireille, via phone and explained hospice services. Pt. defers to dtr. she will meet with liaison tomorrow at 11:00. Vinicius Burks RN

## 2019-05-01 NOTE — PROGRESS NOTE ADULT - ASSESSMENT
89 yo man with HTN, HLD, prior CVA (likely afib as pt is on Eliquis 2.5), sinus node dysfunction s/p PPM presenting from home with RUQ abdominal pain. 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 Also RUQ US notable for possible acute cholecystitis, surgery recommending medical management and oncologic workup.

## 2019-05-01 NOTE — PROGRESS NOTE ADULT - PROBLEM SELECTOR PLAN 5
VTE ppx- SCDs for now given possible GB perforation pending IR eval  Diet- keep NPO today pending IR eval  GOC- pt defers decisions to his daughter Mireille Delatorre [phone]. Daughter believes pt will want to be DNR/I but would want surgical/invasive tx/workup if indicated. Keep pt full code for now, daughter will clarify with pt and other family members today.

## 2019-05-01 NOTE — GOALS OF CARE CONVERSATION - PERSONAL ADVANCE DIRECTIVE - STAFF/PROVIDER
Juan David Duffy MD- internal medicine (team 1) resident Juan David Duffy MD and Lucy Zepeda MD- internal medicine (team 1) residents

## 2019-05-01 NOTE — PROGRESS NOTE ADULT - ATTENDING COMMENTS
advanced care planning- GOC discussed with pt and her 2 daughters Mireille and Matilde  at the bedside (pt is AAOx3 but defers all medical decision making to his daughters)- pt and daughters understand based on the imaging pt likely has metastatic cancer that is treatable but not curable.  Both daughters states that goal is to keep pt comfortable and to avoid any invasive procedures (inc. biopsy) that are not curative. Both daughters state they would not want their father to received any cancer therapy, inc. chemo, radiation, surgery, as these therapies will not provide cure but will likely decrease pt's quality of life. pt is made DNI/DNR. will refer to hospice care   total face-to-face time spent on advanced care planning 15min advance care planning- GOC discussed with pt and her 2 daughters Mireille and Matilde  at the bedside (pt is AAOx3 but defers all medical decision making to his daughters)- pt and daughters understand based on the imaging pt likely has metastatic cancer that is treatable but not curable.  Both daughters states that goal is to keep pt comfortable and to avoid any invasive procedures (inc. biopsy) that are not curative. Both daughters state they would not want their father to received any cancer therapy, inc. chemo, radiation, surgery, as these therapies will not provide cure but will likely decrease pt's quality of life. pt is made DNI/DNR. will refer to hospice care   total face-to-face time spent on advance care planning 20min

## 2019-05-02 LAB
ANION GAP SERPL CALC-SCNC: 14 MMO/L — SIGNIFICANT CHANGE UP (ref 7–14)
BACTERIA UR CULT: SIGNIFICANT CHANGE UP
BUN SERPL-MCNC: 22 MG/DL — SIGNIFICANT CHANGE UP (ref 7–23)
CALCIUM SERPL-MCNC: 8.5 MG/DL — SIGNIFICANT CHANGE UP (ref 8.4–10.5)
CHLORIDE SERPL-SCNC: 100 MMOL/L — SIGNIFICANT CHANGE UP (ref 98–107)
CO2 SERPL-SCNC: 19 MMOL/L — LOW (ref 22–31)
CREAT SERPL-MCNC: 0.88 MG/DL — SIGNIFICANT CHANGE UP (ref 0.5–1.3)
GLUCOSE SERPL-MCNC: 137 MG/DL — HIGH (ref 70–99)
HCT VFR BLD CALC: 43.1 % — SIGNIFICANT CHANGE UP (ref 39–50)
HGB BLD-MCNC: 14.6 G/DL — SIGNIFICANT CHANGE UP (ref 13–17)
MAGNESIUM SERPL-MCNC: 2.1 MG/DL — SIGNIFICANT CHANGE UP (ref 1.6–2.6)
MCHC RBC-ENTMCNC: 30.5 PG — SIGNIFICANT CHANGE UP (ref 27–34)
MCHC RBC-ENTMCNC: 33.9 % — SIGNIFICANT CHANGE UP (ref 32–36)
MCV RBC AUTO: 90.2 FL — SIGNIFICANT CHANGE UP (ref 80–100)
NRBC # FLD: 0 K/UL — SIGNIFICANT CHANGE UP (ref 0–0)
PHOSPHATE SERPL-MCNC: 2.5 MG/DL — SIGNIFICANT CHANGE UP (ref 2.5–4.5)
PLATELET # BLD AUTO: 306 K/UL — SIGNIFICANT CHANGE UP (ref 150–400)
PMV BLD: 11.3 FL — SIGNIFICANT CHANGE UP (ref 7–13)
POTASSIUM SERPL-MCNC: 3.2 MMOL/L — LOW (ref 3.5–5.3)
POTASSIUM SERPL-SCNC: 3.2 MMOL/L — LOW (ref 3.5–5.3)
RBC # BLD: 4.78 M/UL — SIGNIFICANT CHANGE UP (ref 4.2–5.8)
RBC # FLD: 13.1 % — SIGNIFICANT CHANGE UP (ref 10.3–14.5)
SODIUM SERPL-SCNC: 133 MMOL/L — LOW (ref 135–145)
WBC # BLD: 17.93 K/UL — HIGH (ref 3.8–10.5)
WBC # FLD AUTO: 17.93 K/UL — HIGH (ref 3.8–10.5)

## 2019-05-02 PROCEDURE — 99222 1ST HOSP IP/OBS MODERATE 55: CPT

## 2019-05-02 PROCEDURE — 99233 SBSQ HOSP IP/OBS HIGH 50: CPT

## 2019-05-02 RX ORDER — POLYETHYLENE GLYCOL 3350 17 G/17G
17 POWDER, FOR SOLUTION ORAL DAILY
Qty: 0 | Refills: 0 | Status: DISCONTINUED | OUTPATIENT
Start: 2019-05-02 | End: 2019-05-04

## 2019-05-02 RX ORDER — POTASSIUM CHLORIDE 20 MEQ
40 PACKET (EA) ORAL ONCE
Qty: 0 | Refills: 0 | Status: COMPLETED | OUTPATIENT
Start: 2019-05-02 | End: 2019-05-02

## 2019-05-02 RX ADMIN — Medication 40 MILLIEQUIVALENT(S): at 09:41

## 2019-05-02 RX ADMIN — PIPERACILLIN AND TAZOBACTAM 25 GRAM(S): 4; .5 INJECTION, POWDER, LYOPHILIZED, FOR SOLUTION INTRAVENOUS at 17:06

## 2019-05-02 RX ADMIN — POLYETHYLENE GLYCOL 3350 17 GRAM(S): 17 POWDER, FOR SOLUTION ORAL at 09:41

## 2019-05-02 RX ADMIN — PIPERACILLIN AND TAZOBACTAM 25 GRAM(S): 4; .5 INJECTION, POWDER, LYOPHILIZED, FOR SOLUTION INTRAVENOUS at 00:43

## 2019-05-02 RX ADMIN — PIPERACILLIN AND TAZOBACTAM 25 GRAM(S): 4; .5 INJECTION, POWDER, LYOPHILIZED, FOR SOLUTION INTRAVENOUS at 07:43

## 2019-05-02 RX ADMIN — ATORVASTATIN CALCIUM 10 MILLIGRAM(S): 80 TABLET, FILM COATED ORAL at 22:18

## 2019-05-02 NOTE — PROGRESS NOTE ADULT - ASSESSMENT
89 yo man with HTN, HLD, prior CVA (likely afib as pt is on Eliquis 2.5), sinus node dysfunction s/p PPM presenting from home with RUQ abdominal pain. 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 Also RUQ US notable for possible acute cholecystitis, surgery recommending medical management and oncologic workup. Now DNR/I pending hospice eval.

## 2019-05-02 NOTE — PROGRESS NOTE ADULT - PROBLEM SELECTOR PLAN 5
VTE ppx- SCDs for now given possible GB perforation  Diet- advance as tolerated  GOC- pt defers decisions to his daughter Mireille Delatorre [phone]. DNR/I. Hospice eval in progress.

## 2019-05-02 NOTE — CONSULT NOTE ADULT - ASSESSMENT
89 yo man in otherwise good health presenting with nausea, RUQ abdominal pain and CT findings concerning for a possible perforated gallbladder neoplasm. Patient well-appearing, without evidence of sepsis.     - NPO / IVF  - No antibiotics at this time. If patient decompensates, consider perc efren and initiation of antibiotics.    - Consider IR evaluation for biopsy for tissue diagnosis    Discussed above with surgical Attending, Dr. aTm.   EM Garcia MD PGY3  [id number]
89 year old male with HTN, HLD, CVA, sinus node dysfunction with PPM, presenting with RUQ abdominal pain.    Functional, independent with ADLs  Afebrile  Bandemic on admission  Blood cultures so far no growth  CT A/P with concern for perforated gallbladder, possible neoplasm, necrotic retoperitoneal lymphadenopathy, adrenal masses, and suspicion for peritoneal carcinamatosis  Leukocytosis worsening  Remains with RUQ abd pain    Recommend:  -Continue zosyn 3.375 grams IV q 8 hours  -Continue to monitor WBC  -F/U blood cultures  -If within GOC may need source control especially if WBC continues to increase  -Monitor for fevers

## 2019-05-02 NOTE — PROGRESS NOTE ADULT - PROBLEM SELECTOR PLAN 2
RUQ concerning for acute cholecystitis. Afebrile and mild leukocytosis, though with 15% bandemia, lactate 2.0  - Discussed at length with surgery- given likelihood of metastatic disease and current well appearance, will defer surgical management  - will treat empirically for acute efren with zosyn (started 4/30)  - pain management- dilaudid 0.25mg IV q4h prn- minimal requirements presently

## 2019-05-02 NOTE — PROGRESS NOTE ADULT - SUBJECTIVE AND OBJECTIVE BOX
CHIEF COMPLAINT: Patient is a 89y old  Male who presents with a chief complaint of RUQ pain (01 May 2019 13:28)      SUBJECTIVE / OVERNIGHT EVENTS: No acute events overnight. Pt seen and examined at bedside.      MEDICATIONS:  MEDICATIONS  (STANDING):  atorvastatin 10 milliGRAM(s) Oral at bedtime  piperacillin/tazobactam IVPB. 3.375 Gram(s) IV Intermittent every 8 hours    MEDICATIONS  (PRN):  HYDROmorphone  Injectable 0.25 milliGRAM(s) IV Push every 4 hours PRN Severe Pain (7 - 10)        OBJECTIVE:  Vital Signs Last 24 Hrs  T(C): 36.6 (02 May 2019 05:16), Max: 36.7 (01 May 2019 09:20)  T(F): 97.8 (02 May 2019 05:16), Max: 98.1 (01 May 2019 09:20)  HR: 97 (02 May 2019 05:16) (78 - 97)  BP: 112/75 (02 May 2019 05:16) (102/68 - 125/68)  BP(mean): --  RR: 17 (02 May 2019 05:16) (16 - 18)  SpO2: 96% (02 May 2019 05:16) (95% - 97%)  CAPILLARY BLOOD GLUCOSE        I&O's Summary        PHYSICAL EXAM:  GENERAL: NAD, pleasant elderly man, [place]  HEENT: EOMI, PERRLA, conjunctiva and sclera clear  NECK: Supple, No JVD  CHEST/LUNG: CTABL  HEART: RRR; S1 S2, No murmurs, rubs, or gallops  ABDOMEN: Soft, TTP RUQ, no rebound, Nondistended; BS x4   EXTREMITIES:  2+ Pulses, No edema  NEUROLOGY: AAOx3  SKIN: No rashes or lesions      LABS:                        13.5   13.81 )-----------( 282      ( 01 May 2019 06:30 )             40.8     05-01    144  |  109<H>  |  34<H>  ----------------------------<  139<H>  3.7   |  17<L>  |  1.02    Ca    8.5      01 May 2019 06:30    TPro  6.0  /  Alb  2.7<L>  /  TBili  1.1  /  DBili  x   /  AST  55<H>  /  ALT  41  /  AlkPhos  58  05-01    PT/INR - ( 01 May 2019 06:30 )   PT: 14.9 SEC;   INR: 1.30          PTT - ( 01 May 2019 06:30 )  PTT:27.8 SEC      Urinalysis Basic - ( 30 Apr 2019 09:38 )    Color: YELLOW / Appearance: CLEAR / SG: > 1.040 / pH: 6.5  Gluc: NEGATIVE / Ketone: MODERATE  / Bili: TRACE / Urobili: SMALL   Blood: SMALL / Protein: 200 / Nitrite: NEGATIVE   Leuk Esterase: NEGATIVE / RBC: 11-25 / WBC 11-25   Sq Epi: FEW / Non Sq Epi: x / Bacteria: NEGATIVE        Culture - Urine (collected 30 Apr 2019 16:03)  Source: URINE MIDSTREAM  Preliminary Report (01 May 2019 09:13):    NO GROWTH TO DATE    Culture - Blood (collected 30 Apr 2019 10:42)  Source: BLOOD VENOUS  Preliminary Report (01 May 2019 10:44):    NO ORGANISMS ISOLATED    NO ORGANISMS ISOLATED AT 24 HOURS    Culture - Blood (collected 30 Apr 2019 10:42)  Source: BLOOD PERIPHERAL  Preliminary Report (01 May 2019 10:44):    NO ORGANISMS ISOLATED    NO ORGANISMS ISOLATED AT 24 HOURS CHIEF COMPLAINT: Patient is a 89y old  Male who presents with a chief complaint of RUQ pain (01 May 2019 13:28)      SUBJECTIVE / OVERNIGHT EVENTS: No acute events overnight. Pt seen and examined at bedside. Has no pain today, hasn't required any Dilaudid since yesterday morning. Awaiting hospice eval.       MEDICATIONS:  MEDICATIONS  (STANDING):  atorvastatin 10 milliGRAM(s) Oral at bedtime  piperacillin/tazobactam IVPB. 3.375 Gram(s) IV Intermittent every 8 hours    MEDICATIONS  (PRN):  HYDROmorphone  Injectable 0.25 milliGRAM(s) IV Push every 4 hours PRN Severe Pain (7 - 10)        OBJECTIVE:  Vital Signs Last 24 Hrs  T(C): 36.6 (02 May 2019 05:16), Max: 36.7 (01 May 2019 09:20)  T(F): 97.8 (02 May 2019 05:16), Max: 98.1 (01 May 2019 09:20)  HR: 97 (02 May 2019 05:16) (78 - 97)  BP: 112/75 (02 May 2019 05:16) (102/68 - 125/68)  BP(mean): --  RR: 17 (02 May 2019 05:16) (16 - 18)  SpO2: 96% (02 May 2019 05:16) (95% - 97%)  CAPILLARY BLOOD GLUCOSE        I&O's Summary        PHYSICAL EXAM:  GENERAL: NAD, pleasant elderly man, [place]  HEENT: EOMI, PERRLA, conjunctiva and sclera clear  NECK: Supple, No JVD  CHEST/LUNG: CTABL  HEART: RRR; S1 S2, No murmurs, rubs, or gallops  ABDOMEN: Soft, TTP RUQ, no rebound, Nondistended; BS x4   EXTREMITIES:  2+ Pulses, No edema  NEUROLOGY: AAOx3  SKIN: No rashes or lesions      LABS:                        13.5   13.81 )-----------( 282      ( 01 May 2019 06:30 )             40.8     05-01    144  |  109<H>  |  34<H>  ----------------------------<  139<H>  3.7   |  17<L>  |  1.02    Ca    8.5      01 May 2019 06:30    TPro  6.0  /  Alb  2.7<L>  /  TBili  1.1  /  DBili  x   /  AST  55<H>  /  ALT  41  /  AlkPhos  58  05-01    PT/INR - ( 01 May 2019 06:30 )   PT: 14.9 SEC;   INR: 1.30          PTT - ( 01 May 2019 06:30 )  PTT:27.8 SEC      Urinalysis Basic - ( 30 Apr 2019 09:38 )    Color: YELLOW / Appearance: CLEAR / SG: > 1.040 / pH: 6.5  Gluc: NEGATIVE / Ketone: MODERATE  / Bili: TRACE / Urobili: SMALL   Blood: SMALL / Protein: 200 / Nitrite: NEGATIVE   Leuk Esterase: NEGATIVE / RBC: 11-25 / WBC 11-25   Sq Epi: FEW / Non Sq Epi: x / Bacteria: NEGATIVE        Culture - Urine (collected 30 Apr 2019 16:03)  Source: URINE MIDSTREAM  Preliminary Report (01 May 2019 09:13):    NO GROWTH TO DATE    Culture - Blood (collected 30 Apr 2019 10:42)  Source: BLOOD VENOUS  Preliminary Report (01 May 2019 10:44):    NO ORGANISMS ISOLATED    NO ORGANISMS ISOLATED AT 24 HOURS    Culture - Blood (collected 30 Apr 2019 10:42)  Source: BLOOD PERIPHERAL  Preliminary Report (01 May 2019 10:44):    NO ORGANISMS ISOLATED    NO ORGANISMS ISOLATED AT 24 HOURS

## 2019-05-02 NOTE — CHART NOTE - NSCHARTNOTEFT_GEN_A_CORE
IR consulted for placement of percutaneous cholecystostomy tube. [id number]    The patient is a 89yo M with a thickened gallbladder wall with evidence of gallbladder perforation. There is a question of malignancy given the adenopathy and adrenal masses. The patient has been on ABx however his WBC count continues to rise.     The patient was seen and examined. He has focal tenderness in the RUQ, otherwise no complaints. He is tolerating food although does have a decreased appetite. A discussion was had about placing a cholecystostomy tube for infection control and the patient wished to speak to his daughters before committing to a tube.     - Please make the patient NPO for possible cholecystostomy tube placement tomorrow.   - Obtain CBC, Coags, and BMP in the AM. IR consulted for placement of percutaneous cholecystostomy tube. [id number]    The patient is a 89yo M with a thickened gallbladder wall with evidence of gallbladder perforation. There is a question of malignancy given the adenopathy and adrenal masses. The patient has been on ABx however his WBC count continues to rise.     The patient was seen and examined with food at bedside. He has focal tenderness in the RUQ, otherwise no complaints. He is tolerating food although does have a decreased appetite. A discussion was had about placing a cholecystostomy tube for infection control and the patient wished to speak to his daughters before committing to a tube.     - Please make the patient NPO for possible cholecystostomy tube placement tomorrow.   - Obtain CBC, Coags, and BMP in the AM.

## 2019-05-02 NOTE — CHART NOTE - NSCHARTNOTEFT_GEN_A_CORE
Team B Surgery    We agree with the plan of having IR place a percutaneous cholecystostomy tube. Given the patient's likely gallbladder mass with metastatic disease, a percutaneous procedure would be more appropriate than surgery, to obtain source control.    YEN Shepard  [id number]

## 2019-05-02 NOTE — CONSULT NOTE ADULT - ATTENDING COMMENTS
I have reviewed the history, pertinent labs and imaging, and discussed the care with the consult resident.  Agree with her assessment and recommendations.    The Acute Care Surgery (B Team) Attending Group Practice:  Dr. Richard Tam, Dr. Gee Slade, Dr. Zenaida Coley, Dr. Mark Gutierrez    urgent issues - spectra [id number] or [id number]  nonurgent issues - [phone]  patient appointments or afterhours - [phone]
Arun Roque MD  Pager [phone]  After 5pm/weekends call [phone]

## 2019-05-02 NOTE — CHART NOTE - NSCHARTNOTEFT_GEN_A_CORE
Pt's leukocytosis increasing to 17 today, also now mildly tachycardic and pt having worsening pain today, overall clinical picture c/w sepsis 2/2 known gall bladder perforation. Case discussed at length throughout the day with family, ID attending (Dr. Roque), and pt's PCP Dr. Johnston. Overall consensus is that despite antibiotics, pt may continue to have worsening symptoms unless he has source control, e.g. cholecystectomy or other surgical intervention. Despite the fact that pt is a DNR/I and undergoing hospice eval, it would be within pt's GOC to have surgery if it can lessen his suffering, even though surgery may not be curative in light of possible metastatic disease based on imaging. Pt's ultimate goal is to return home with hospice services and to maintain quality of life as much as possible prior to a peaceful and natural death.    IR was also consulted for perc efren for comfort-- imaging to be reviewed with IR attending for a drainable collection; it is unclear whether perc efren would be helpful as pt already has a known perforation, and the drain would also be an additional conduit for infection.   Case discussed again with surgery resident, to review case with surgery attending.     Juan David Duffy MD  Internal medicine resident- team 1  [id number] Pt's leukocytosis increasing to 17 today, also now mildly tachycardic and pt having worsening pain today, overall clinical picture c/w sepsis 2/2 known gall bladder perforation. Case discussed at length throughout the day with family, ID attending (Dr. Roque), and pt's PCP Dr. Johnston. Overall consensus is that despite antibiotics, pt may continue to have worsening symptoms unless he has source control, e.g. cholecystectomy or other surgical intervention. Despite the fact that pt is a DNR/I and undergoing hospice eval, it would be within pt's GOC to have surgery if it can lessen his suffering, even though surgery may not be curative in light of possible metastatic disease based on imaging. Pt's ultimate goal is to return home with hospice services and to maintain quality of life as much as possible prior to a peaceful and natural death.    IR was also consulted for perc efren for comfort-- imaging to be reviewed with IR attending for a drainable collection; it is unclear whether perc efren would be helpful as pt already has a known perforation.  Case discussed again with surgery resident, to review case with surgery attending.     Juan David Duffy MD  Internal medicine resident- team 1  [id number] Pt's leukocytosis increasing to 17 today, also now mildly tachycardic and pt having worsening pain today, overall clinical picture c/w sepsis 2/2 known gall bladder perforation. Case discussed at length throughout the day with family, ID attending (Dr. Roque), and pt's PCP Dr. Johnston. Overall consensus is that despite antibiotics, pt may continue to have worsening symptoms unless he has source control, e.g. cholecystectomy or other surgical intervention. Despite the fact that pt is a DNR/I and undergoing hospice eval, it would be within pt's GOC to have surgery if it can lessen his suffering, even though surgery may not be curative in light of possible metastatic disease based on imaging. Pt's ultimate goal is to return home with hospice services and to maintain quality of life as much as possible prior to a peaceful and natural death.    IR was also consulted for perc efren for comfort-- imaging to be reviewed with IR attending for a drainable collection, will keep pt NPO for possible perc efren. I discussed this possibility with patient's daughter and HCP Mireille, who is interested if it can decrease his suffering.  Case discussed again with surgery resident, to review case with surgery attending.     PATIENT IS [place] AND DEFERS DECISION MAKING TO HIS DAUGHTER MIREILLE, WHO CAN BE REACHED [phone] FOR CONSENT.      Juan David Duffy MD  Internal medicine resident- team 1  [id number]

## 2019-05-02 NOTE — PROGRESS NOTE ADULT - PROBLEM SELECTOR PLAN 1
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 Given likelihood of metastatic disease and pt currently well appearing, will hold off surgery   - discussed GOC at length with pt in setting of new likely metastatic disease. Pt is DNR/I, hospice care eval in progress. Meeting with daughter this am. 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 Given likelihood of metastatic disease and pt currently well appearing, will hold off surgery   - discussed GOC at length with pt in setting of new likely metastatic disease. Pt is DNR/I, hospice care eval in progress. Meeting with daughter this am.  - worsening sepsis and pain 2/2 gall bladder perforation. ID consulted for antibiotics. Ultimately will continue to worsen without source control (i.e. cholecystectomy)- discussed with pt's family. it would be within pt's GOC to undergo surgery if it could decrease pain and suffering, even if cholecystectomy would not be curative for metastatic disease. Will discuss again with surgery.

## 2019-05-02 NOTE — GOALS OF CARE CONVERSATION - PERSONAL ADVANCE DIRECTIVE - CONVERSATION DETAILS
[facility]: Consents signed. Family requested delivery of walker only 5-4-10. Vinicius Burks RN

## 2019-05-02 NOTE — CONSULT NOTE ADULT - SUBJECTIVE AND OBJECTIVE BOX
HPI:  89 yo man with HTN, HLD, prior CVA (likely afib as pt is on Eliquis 2.5), sinus node dysfunction s/p PPM presenting from home with RUQ abdominal pain. Pt is very [place] (hearing aids currently removed), and defers interview to his daughter, Mireille Delatorre [phone], from whom history was obtained over the phone. Pt developed nausea two days prior to presentation, then subsequently developed RUQ pain that worsened overnight. Denies fever, chills or diarrhea. He has never had pain like this previously. He does not have an appetite today, but usually he has a good appetite and denies recent weight loss. His last colonoscopy was greater than a decade ago. Pt lives with his daughter Mireille and is very independent in his ADLs. He travels to [place] by bus by himself. Daughter believes pt would want to be a DNR/I while on the floor, but would rescind it temporarily for surgery/procedure, which they would agree to if indicated; she will clarify code status with him and family today.    In the ED, VS: Tm 98.6F, HR 90-100s, BP 91/55, SpO2 98% on RA. Labs notable for mild leukocytosis 11, bands 15%, lactate 2.0, pH 7.46, bicarb 18. Received 500cc bolus x2 and zosyn x1. Pt underwent CT A/P with IV contrast, which showed findings suspicious for gall bladder mass with signs of perforation, extensive necrotic RP lymphadenopathy, omental nodularity, and acute cholecystitis. RUQ also showed findings suspicious for acute cholecystitis. Surgery was consulted and recommended monitoring off antibiotics, and less invasive oncology workup.     Patient remains with RUQ pain. He remains on zosyn. Afebrile. Leukocytosis worsening. He was bandemic on admission. Blood cultures no growth to date.      PAST MEDICAL & SURGICAL HISTORY:  Bradycardia  CVA (Cerebral Infarction): No residual signs on exam  GERD (Gastroesophageal Reflux Disease)  History of Carotid Stenosis  Hypercholesteremia  Hypertension  Pacemaker  Pacemaker  IH (Inguinal Hernia): Left  History of Knee Surgery: Left knee in &#x27;s    Allergies    No Known Allergies    Intolerances    ANTIMICROBIALS:  piperacillin/tazobactam IVPB. 3.375 every 8 hours    OTHER MEDS:  atorvastatin 10 milliGRAM(s) Oral at bedtime  HYDROmorphone  Injectable 0.25 milliGRAM(s) IV Push every 4 hours PRN  polyethylene glycol 3350 17 Gram(s) Oral daily    SOCIAL HISTORY: Remote history of smoking (greater than 60 years ago). Occasionally drinks wine. Lives with his daughter. [place]. Independent in most ADLs; travels to [place] by himself on occasion.    FAMILY HISTORY:  Patient states his parents  many years ago.   No additional history provided by patient    Drug Dosing Weight  Height (cm): 165.1 (2019 19:33)  Weight (kg): 64.2 (2019 19:33)  BMI (kg/m2): 23.6 (2019 19:33)  BSA (m2): 1.71 (2019 19:33)    PE:    Vital Signs Last 24 Hrs  T(C): 36.2 (02 May 2019 13:13), Max: 36.6 (01 May 2019 17:12)  T(F): 97.2 (02 May 2019 13:13), Max: 97.9 (01 May 2019 17:12)  HR: 99 (02 May 2019 13:13) (95 - 99)  BP: 125/78 (02 May 2019 13:13) (112/75 - 125/78)  BP(mean): --  RR: 16 (02 May 2019 13:13) (16 - 18)  SpO2: 96% (02 May 2019 13:13) (95% - 96%)    Gen: AOx3, NAD, non-toxic, pleasant  CV: S1+S2 normal, no murmurs, +PPM intact  Resp: Clear bilat, no resp distress  Abd: Soft, RUQ tender, +BS  Ext: No LE edema, no wounds  : No Beebe  IV/Skin: No thrombophlebitis  Msk: No low back pain, no arthralgias, no joint swelling  Neuro: No sensory deficits, no motor deficits    LABS:                          14.6   17.93 )-----------( 306      ( 02 May 2019 06:20 )             43.1       05-02    133<L>  |  100  |  22  ----------------------------<  137<H>  3.2<L>   |  19<L>  |  0.88    Ca    8.5      02 May 2019 06:20  Phos  2.5     -  Mg     2.1     -    TPro  6.0  /  Alb  2.7<L>  /  TBili  1.1  /  DBili  x   /  AST  55<H>  /  ALT  41  /  AlkPhos  58  -    MICROBIOLOGY:  v  URINE MIDSTREAM  19 --  --  --      BLOOD PERIPHERAL  19 --  --  --    RADIOLOGY:    < from: CT Abdomen and Pelvis w/ IV Cont (19 @ 09:01) >  IMPRESSION:     Diffuse wall thickening of the gallbladder, with apparent discontinuity   at the inferior aspect of the gallbladder and several low-attenuation   collections adjacent to the inferior gallbladder, concerning for   perforation.  A perforated gallbladder neoplasm is considered,   particularly in view of theextensive necrotic retroperitoneal   lymphadenopathy, bilateral adrenal masses, and nodularity of the omentum   in the right upper quadrant, which is suspicious for peritoneal   carcinomatosis.    Normal appendix.    Colonic diverticulosis.    These findings were discussed with Dr. Gross on 2019 at 9:30 AM by   Dr. Bunn with read back confirmation.        *** END OF ADDENDUM 2019  ***      PROCEDURE DATE:  2019         INTERPRETATION:  Correction to initial report: There is trace   pericholecystic fluid and perihepatic ascites, suspicious for acute   cholecystitis.    Dr. Parker discussed the findings with Dr. Amaya on 2019 at 9:12   AM.  Readback was obtained.          ***Please see the addendum at the top of this report. It may contain   additional important information or changes.****          VANESSA PARKER M.D., ATTENDING RADIOLOGIST  This document has been electronically signed. 2019  9:12AM  Addend:  LOUISE BUNN M.D., ATTENDING RADIOLOGIST  This addendum was electronically signed on: 2019  9:55AM.    < end of copied text >

## 2019-05-03 ENCOUNTER — RESULT REVIEW (OUTPATIENT)
Age: 84
End: 2019-05-03

## 2019-05-03 LAB
ANION GAP SERPL CALC-SCNC: 10 MMO/L — SIGNIFICANT CHANGE UP (ref 7–14)
APTT BLD: 26.6 SEC — LOW (ref 27.5–36.3)
BUN SERPL-MCNC: 19 MG/DL — SIGNIFICANT CHANGE UP (ref 7–23)
CALCIUM SERPL-MCNC: 8.4 MG/DL — SIGNIFICANT CHANGE UP (ref 8.4–10.5)
CHLORIDE SERPL-SCNC: 102 MMOL/L — SIGNIFICANT CHANGE UP (ref 98–107)
CO2 SERPL-SCNC: 20 MMOL/L — LOW (ref 22–31)
CREAT SERPL-MCNC: 0.84 MG/DL — SIGNIFICANT CHANGE UP (ref 0.5–1.3)
GLUCOSE SERPL-MCNC: 144 MG/DL — HIGH (ref 70–99)
GRAM STN WND: SIGNIFICANT CHANGE UP
HCT VFR BLD CALC: 42.6 % — SIGNIFICANT CHANGE UP (ref 39–50)
HGB BLD-MCNC: 14.3 G/DL — SIGNIFICANT CHANGE UP (ref 13–17)
INR BLD: 1.29 — HIGH (ref 0.88–1.17)
MAGNESIUM SERPL-MCNC: 2.1 MG/DL — SIGNIFICANT CHANGE UP (ref 1.6–2.6)
MCHC RBC-ENTMCNC: 30.6 PG — SIGNIFICANT CHANGE UP (ref 27–34)
MCHC RBC-ENTMCNC: 33.6 % — SIGNIFICANT CHANGE UP (ref 32–36)
MCV RBC AUTO: 91 FL — SIGNIFICANT CHANGE UP (ref 80–100)
NRBC # FLD: 0 K/UL — SIGNIFICANT CHANGE UP (ref 0–0)
PHOSPHATE SERPL-MCNC: 2.6 MG/DL — SIGNIFICANT CHANGE UP (ref 2.5–4.5)
PLATELET # BLD AUTO: 325 K/UL — SIGNIFICANT CHANGE UP (ref 150–400)
PMV BLD: 11.3 FL — SIGNIFICANT CHANGE UP (ref 7–13)
POTASSIUM SERPL-MCNC: 3.5 MMOL/L — SIGNIFICANT CHANGE UP (ref 3.5–5.3)
POTASSIUM SERPL-SCNC: 3.5 MMOL/L — SIGNIFICANT CHANGE UP (ref 3.5–5.3)
PROTHROM AB SERPL-ACNC: 14.8 SEC — HIGH (ref 9.8–13.1)
RBC # BLD: 4.68 M/UL — SIGNIFICANT CHANGE UP (ref 4.2–5.8)
RBC # FLD: 13.3 % — SIGNIFICANT CHANGE UP (ref 10.3–14.5)
SODIUM SERPL-SCNC: 132 MMOL/L — LOW (ref 135–145)
SPECIMEN SOURCE: SIGNIFICANT CHANGE UP
WBC # BLD: 17.94 K/UL — HIGH (ref 3.8–10.5)
WBC # FLD AUTO: 17.94 K/UL — HIGH (ref 3.8–10.5)

## 2019-05-03 PROCEDURE — 88112 CYTOPATH CELL ENHANCE TECH: CPT | Mod: 26

## 2019-05-03 PROCEDURE — 99233 SBSQ HOSP IP/OBS HIGH 50: CPT | Mod: GC

## 2019-05-03 PROCEDURE — 47490 INCISION OF GALLBLADDER: CPT | Mod: GC

## 2019-05-03 PROCEDURE — 88305 TISSUE EXAM BY PATHOLOGIST: CPT | Mod: 26

## 2019-05-03 PROCEDURE — 99232 SBSQ HOSP IP/OBS MODERATE 35: CPT

## 2019-05-03 RX ADMIN — PIPERACILLIN AND TAZOBACTAM 25 GRAM(S): 4; .5 INJECTION, POWDER, LYOPHILIZED, FOR SOLUTION INTRAVENOUS at 07:40

## 2019-05-03 RX ADMIN — ATORVASTATIN CALCIUM 10 MILLIGRAM(S): 80 TABLET, FILM COATED ORAL at 21:42

## 2019-05-03 RX ADMIN — PIPERACILLIN AND TAZOBACTAM 25 GRAM(S): 4; .5 INJECTION, POWDER, LYOPHILIZED, FOR SOLUTION INTRAVENOUS at 00:45

## 2019-05-03 RX ADMIN — PIPERACILLIN AND TAZOBACTAM 25 GRAM(S): 4; .5 INJECTION, POWDER, LYOPHILIZED, FOR SOLUTION INTRAVENOUS at 17:41

## 2019-05-03 NOTE — PROGRESS NOTE ADULT - SUBJECTIVE AND OBJECTIVE BOX
CHIEF COMPLAINT: Patient is a 89y old  Male who presents with a chief complaint of RUQ pain (02 May 2019 15:02)      SUBJECTIVE / OVERNIGHT EVENTS: No acute events overnight. Pt seen and examined at bedside.      MEDICATIONS:  MEDICATIONS  (STANDING):  atorvastatin 10 milliGRAM(s) Oral at bedtime  piperacillin/tazobactam IVPB. 3.375 Gram(s) IV Intermittent every 8 hours  polyethylene glycol 3350 17 Gram(s) Oral daily    MEDICATIONS  (PRN):  HYDROmorphone  Injectable 0.25 milliGRAM(s) IV Push every 4 hours PRN Severe Pain (7 - 10)        OBJECTIVE:  Vital Signs Last 24 Hrs  T(C): 37.4 (03 May 2019 04:58), Max: 37.4 (03 May 2019 04:58)  T(F): 99.4 (03 May 2019 04:58), Max: 99.4 (03 May 2019 04:58)  HR: 96 (03 May 2019 04:58) (73 - 99)  BP: 125/84 (03 May 2019 04:58) (117/75 - 125/84)  BP(mean): --  RR: 15 (03 May 2019 04:58) (15 - 17)  SpO2: 98% (03 May 2019 04:58) (96% - 99%)  CAPILLARY BLOOD GLUCOSE        I&O's Summary        PHYSICAL EXAM:  GENERAL: NAD, pleasant elderly man, [place]  HEENT: EOMI, PERRLA, conjunctiva and sclera clear  NECK: Supple, No JVD  CHEST/LUNG: CTABL  HEART: RRR; S1 S2, No murmurs, rubs, or gallops  ABDOMEN: Soft, TTP RUQ, no rebound, Nondistended; BS x4   EXTREMITIES:  2+ Pulses, No edema  NEUROLOGY: AAOx3  SKIN: No rashes or lesions    LABS:                        14.3   17.94 )-----------( 325      ( 03 May 2019 05:50 )             42.6     05-02    133<L>  |  100  |  22  ----------------------------<  137<H>  3.2<L>   |  19<L>  |  0.88    Ca    8.5      02 May 2019 06:20  Phos  2.5     05-02  Mg     2.1     05-02      PT/INR - ( 03 May 2019 05:50 )   PT: 14.8 SEC;   INR: 1.29          PTT - ( 03 May 2019 05:50 )  PTT:26.6 SEC          Culture - Urine (collected 30 Apr 2019 16:03)  Source: URINE MIDSTREAM  Final Report (02 May 2019 09:17):    NO GROWTH AT 24 HOURS    Culture - Blood (collected 30 Apr 2019 10:42)  Source: BLOOD VENOUS  Preliminary Report (02 May 2019 10:45):    NO ORGANISMS ISOLATED    NO ORGANISMS ISOLATED AT 48 HRS.    Culture - Blood (collected 30 Apr 2019 10:42)  Source: BLOOD PERIPHERAL  Preliminary Report (02 May 2019 10:45):    NO ORGANISMS ISOLATED    NO ORGANISMS ISOLATED AT 48 HRS. CHIEF COMPLAINT: Patient is a 89y old  Male who presents with a chief complaint of RUQ pain (02 May 2019 15:02)      SUBJECTIVE / OVERNIGHT EVENTS: No acute events overnight. Pt seen and examined at bedside. Denies pain this morning. Defers decision on drain to daughter Mireille. No new complaints.      MEDICATIONS:  MEDICATIONS  (STANDING):  atorvastatin 10 milliGRAM(s) Oral at bedtime  piperacillin/tazobactam IVPB. 3.375 Gram(s) IV Intermittent every 8 hours  polyethylene glycol 3350 17 Gram(s) Oral daily    MEDICATIONS  (PRN):  HYDROmorphone  Injectable 0.25 milliGRAM(s) IV Push every 4 hours PRN Severe Pain (7 - 10)        OBJECTIVE:  Vital Signs Last 24 Hrs  T(C): 37.4 (03 May 2019 04:58), Max: 37.4 (03 May 2019 04:58)  T(F): 99.4 (03 May 2019 04:58), Max: 99.4 (03 May 2019 04:58)  HR: 96 (03 May 2019 04:58) (73 - 99)  BP: 125/84 (03 May 2019 04:58) (117/75 - 125/84)  BP(mean): --  RR: 15 (03 May 2019 04:58) (15 - 17)  SpO2: 98% (03 May 2019 04:58) (96% - 99%)  CAPILLARY BLOOD GLUCOSE        I&O's Summary        PHYSICAL EXAM:  GENERAL: NAD, pleasant elderly man, [place]  HEENT: EOMI, PERRLA, conjunctiva and sclera clear  NECK: Supple, No JVD  CHEST/LUNG: CTABL  HEART: RRR; S1 S2, No murmurs, rubs, or gallops  ABDOMEN: Soft, TTP RUQ, no rebound, Nondistended; BS x4   EXTREMITIES:  2+ Pulses, No edema  NEUROLOGY: AAOx3  SKIN: No rashes or lesions    LABS:                        14.3   17.94 )-----------( 325      ( 03 May 2019 05:50 )             42.6     05-02    133<L>  |  100  |  22  ----------------------------<  137<H>  3.2<L>   |  19<L>  |  0.88    Ca    8.5      02 May 2019 06:20  Phos  2.5     05-02  Mg     2.1     05-02      PT/INR - ( 03 May 2019 05:50 )   PT: 14.8 SEC;   INR: 1.29          PTT - ( 03 May 2019 05:50 )  PTT:26.6 SEC          Culture - Urine (collected 30 Apr 2019 16:03)  Source: URINE MIDSTREAM  Final Report (02 May 2019 09:17):    NO GROWTH AT 24 HOURS    Culture - Blood (collected 30 Apr 2019 10:42)  Source: BLOOD VENOUS  Preliminary Report (02 May 2019 10:45):    NO ORGANISMS ISOLATED    NO ORGANISMS ISOLATED AT 48 HRS.    Culture - Blood (collected 30 Apr 2019 10:42)  Source: BLOOD PERIPHERAL  Preliminary Report (02 May 2019 10:45):    NO ORGANISMS ISOLATED    NO ORGANISMS ISOLATED AT 48 HRS.

## 2019-05-03 NOTE — GOALS OF CARE CONVERSATION - PERSONAL ADVANCE DIRECTIVE - CONVERSATION DETAILS
[facility]: Please escribe medications to [facility] and call [facility] for discharge  Vinicius Burks RN

## 2019-05-03 NOTE — PROGRESS NOTE ADULT - SUBJECTIVE AND OBJECTIVE BOX
CC: Patient is a 89y old  Male who presents with a chief complaint of RUQ pain (03 May 2019 07:10)    ID following for perforated Gallbladder, leukocytosis    Interval History/ROS: Patient remains with RUQ pain. Denies fever, chills. Remains with leukocytosis.    Rest of ROS negative.    Allergies  No Known Allergies    ANTIMICROBIALS:  piperacillin/tazobactam IVPB. 3.375 every 8 hours    OTHER MEDS:  atorvastatin 10 milliGRAM(s) Oral at bedtime  HYDROmorphone  Injectable 0.25 milliGRAM(s) IV Push every 4 hours PRN  polyethylene glycol 3350 17 Gram(s) Oral daily    PE:    Vital Signs Last 24 Hrs  T(C): 37.4 (03 May 2019 04:58), Max: 37.4 (03 May 2019 04:58)  T(F): 99.4 (03 May 2019 04:58), Max: 99.4 (03 May 2019 04:58)  HR: 96 (03 May 2019 04:58) (73 - 96)  BP: 125/84 (03 May 2019 04:58) (117/75 - 125/84)  BP(mean): --  RR: 15 (03 May 2019 04:58) (15 - 17)  SpO2: 98% (03 May 2019 04:58) (98% - 99%)    Gen: AOx3, NAD, non-toxic  CV: S1+S2 normal, no murmurs  Resp: Clear bilat, no resp distress  Abd: Soft, RUQ tenderness, +BS  Ext: No LE edema, no wounds  : No Beebe  IV/Skin: No thrombophlebitis  Neuro: no focal deficits    LABS:                          14.3   17.94 )-----------( 325      ( 03 May 2019 05:50 )             42.6       05-03    132<L>  |  102  |  19  ----------------------------<  144<H>  3.5   |  20<L>  |  0.84    Ca    8.4      03 May 2019 05:50  Phos  2.6     05-03  Mg     2.1     05-03    MICROBIOLOGY:  v  URINE MIDSTREAM  04-30-19 --  --  --      BLOOD PERIPHERAL  04-30-19 --  --  --    RADIOLOGY:    < from: CT Abdomen and Pelvis w/ IV Cont (04.30.19 @ 09:01) >  IMPRESSION:     Diffuse wall thickening of the gallbladder, with apparent discontinuity   at the inferior aspect of the gallbladder and several low-attenuation   collections adjacent to the inferior gallbladder, concerning for   perforation.  A perforated gallbladder neoplasm is considered,   particularly in view of theextensive necrotic retroperitoneal   lymphadenopathy, bilateral adrenal masses, and nodularity of the omentum   in the right upper quadrant, which is suspicious for peritoneal   carcinomatosis.    Normal appendix.    Colonic diverticulosis.    These findings were discussed with Dr. Gross on 4/30/2019 at 9:30 AM by   Dr. Peralta with read back confirmation.        *** END OF ADDENDUM 04/30/2019  ***      PROCEDURE DATE:  Apr 30 2019         INTERPRETATION:  Correction to initial report: There is trace   pericholecystic fluid and perihepatic ascites, suspicious for acute   cholecystitis.    Dr. Parker discussed the findings with Dr. Amaya on April 30, 2019 at 9:12   AM.  Readback was obtained.          ***Please see the addendum at the top of this report. It may contain   additional important information or changes.****      < end of copied text >

## 2019-05-03 NOTE — PROGRESS NOTE ADULT - PROBLEM SELECTOR PLAN 1
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 Given likelihood of metastatic disease and pt currently well appearing, will hold off surgery   - discussed GOC at length with pt in setting of new likely metastatic disease. Pt is DNR/I, hospice care eval in progress. Meeting with daughter this am.  - worsening sepsis and pain 2/2 gall bladder perforation. ID consulted for antibiotics. Ultimately will continue to worsen without source control (i.e. cholecystectomy)- discussed with pt's family. it would be within pt's GOC to undergo surgery or other intervention if it would lessen his suffering  - IR planning for possible perc efren today pending discussion with family. 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 Pt is DNR/I, hospice care eval in progress. Meeting with daughter this am.

## 2019-05-03 NOTE — PROGRESS NOTE ADULT - ASSESSMENT
89 year old male with HTN, HLD, CVA, sinus node dysfunction with PPM, presenting with RUQ abdominal pain.    Functional, independent with ADLs  Afebrile  Bandemic on admission  Blood cultures so far no growth  CT A/P with concern for perforated gallbladder, possible neoplasm, necrotic retoperitoneal lymphadenopathy, adrenal masses, and suspicion for peritoneal carcinamatosis  Leukocytosis remains elevated  Remains with RUQ abd pain      Recommend:  -Continue zosyn 3.375 grams IV q 8 hours  -Continue to monitor WBC  -F/U blood cultures  -F/U IR regarding perc efren placement  -Monitor for fevers

## 2019-05-03 NOTE — PROGRESS NOTE ADULT - PROBLEM SELECTOR PLAN 2
RUQ concerning for acute cholecystitis. Afebrile and mild leukocytosis, though with 15% bandemia, lactate 2.0  - Discussed at length with surgery- given likelihood of metastatic disease and current well appearance, will defer surgical management  - will treat empirically for acute efren with zosyn (started 4/30)  - pain management- dilaudid 0.25mg IV q4h prn- minimal requirements presently RUQ concerning for acute cholecystitis. Afebrile and mild leukocytosis, though with 15% bandemia on admission, WBC uptrending   - case discussed with surgery, ID, IR- will need source control but given likely metastatic disease percutaneous procedure more appropriate than surgery. Family in agreement   - IR planning for possible perc efren today   - will treat empirically for acute efren with zosyn (started 4/30)  - pain management- dilaudid 0.25mg IV q4h prn- minimal requirements presently

## 2019-05-03 NOTE — PROGRESS NOTE ADULT - PROBLEM SELECTOR PLAN 5
VTE ppx- SCDs for now given possible GB perforation  Diet- advance as tolerated  GOC- pt defers decisions to his daughter Mireille Delatorre [phone]. DNR/I. Hospice eval in progress.

## 2019-05-03 NOTE — CHART NOTE - NSCHARTNOTEFT_GEN_A_CORE
IR Pre-Procedure Note    Patient Age:   89y    Patient Gender:   Male    Procedure (including site / side if known): Percutaneous cholecystostomy    Diagnosis / Indication: Patient is a 89y old  Male who presents with a chief complaint of RUQ pain (03 May 2019 07:10)      Interventional Radiology Attending Physician: Dr. Tamayo    Ordering Attending Physician: Dr. Mathew Estes    PAST MEDICAL & SURGICAL HISTORY:  Bradycardia  CVA (Cerebral Infarction): No residual signs on exam  GERD (Gastroesophageal Reflux Disease)  History of Carotid Stenosis  Hypercholesteremia  Hypertension  Pacemaker  Pacemaker  IH (Inguinal Hernia): Left  History of Knee Surgery: Left knee in 1970&#x27;s       Pertinent Labs:   CBC Full  -  ( 03 May 2019 05:50 )  WBC Count : 17.94 K/uL  RBC Count : 4.68 M/uL  Hemoglobin : 14.3 g/dL  Hematocrit : 42.6 %  Platelet Count - Automated : 325 K/uL  Mean Cell Volume : 91.0 fL  Mean Cell Hemoglobin : 30.6 pg  Mean Cell Hemoglobin Concentration : 33.6 %  Auto Neutrophil # : x  Auto Lymphocyte # : x  Auto Monocyte # : x  Auto Eosinophil # : x  Auto Basophil # : x  Auto Neutrophil % : x  Auto Lymphocyte % : x  Auto Monocyte % : x  Auto Eosinophil % : x  Auto Basophil % : x    05-03    132<L>  |  102  |  19  ----------------------------<  144<H>  3.5   |  20<L>  |  0.84    Ca    8.4      03 May 2019 05:50  Phos  2.6     05-03  Mg     2.1     05-03      PT/INR - ( 03 May 2019 05:50 )   PT: 14.8 SEC;   INR: 1.29          PTT - ( 03 May 2019 05:50 )  PTT:26.6 SEC    Patient / Family aware of procedure:   [ x ] Y   [  ] N    Patient is consentable; hard of hearing and defers most decision-making to daughter Mireille ([facility])- cell [phone]    Juan David Duffy MD IR Pre-Procedure Note    Patient Age:   89y    Patient Gender:   Male    Procedure (including site / side if known): Percutaneous cholecystostomy    Diagnosis / Indication: Patient is a 89y old  Male who presents with a chief complaint of RUQ pain (03 May 2019 07:10)      Interventional Radiology Attending Physician: Dr. Tamayo    Ordering Attending Physician: Dr. Mathew Estes    PAST MEDICAL & SURGICAL HISTORY:  Bradycardia  CVA (Cerebral Infarction): No residual signs on exam  GERD (Gastroesophageal Reflux Disease)  History of Carotid Stenosis  Hypercholesteremia  Hypertension  Pacemaker  Pacemaker  IH (Inguinal Hernia): Left  History of Knee Surgery: Left knee in 1970&#x27;s       Pertinent Labs:   CBC Full  -  ( 03 May 2019 05:50 )  WBC Count : 17.94 K/uL  RBC Count : 4.68 M/uL  Hemoglobin : 14.3 g/dL  Hematocrit : 42.6 %  Platelet Count - Automated : 325 K/uL  Mean Cell Volume : 91.0 fL  Mean Cell Hemoglobin : 30.6 pg  Mean Cell Hemoglobin Concentration : 33.6 %  Auto Neutrophil # : x  Auto Lymphocyte # : x  Auto Monocyte # : x  Auto Eosinophil # : x  Auto Basophil # : x  Auto Neutrophil % : x  Auto Lymphocyte % : x  Auto Monocyte % : x  Auto Eosinophil % : x  Auto Basophil % : x    05-03    132<L>  |  102  |  19  ----------------------------<  144<H>  3.5   |  20<L>  |  0.84    Ca    8.4      03 May 2019 05:50  Phos  2.6     05-03  Mg     2.1     05-03      PT/INR - ( 03 May 2019 05:50 )   PT: 14.8 SEC;   INR: 1.29          PTT - ( 03 May 2019 05:50 )  PTT:26.6 SEC    Patient / Family aware of procedure:   [ x ] Y   [  ] N    Patient is consentable; hard of hearing and defers most decision-making to daughter Mireille ([facility])- cell [phone]    Juan David Duffy MD  pager [phone]

## 2019-05-03 NOTE — PROGRESS NOTE ADULT - PROBLEM SELECTOR PLAN 3
H/o CVA on Eliquis per daughter; suspect thromboembolic etiology  - EKG to check for afib (irregular HR on exam)  - hold Eliquis for possible intervention

## 2019-05-04 LAB
ANION GAP SERPL CALC-SCNC: 15 MMO/L — HIGH (ref 7–14)
BUN SERPL-MCNC: 17 MG/DL — SIGNIFICANT CHANGE UP (ref 7–23)
CALCIUM SERPL-MCNC: 8.3 MG/DL — LOW (ref 8.4–10.5)
CHLORIDE SERPL-SCNC: 97 MMOL/L — LOW (ref 98–107)
CO2 SERPL-SCNC: 21 MMOL/L — LOW (ref 22–31)
CREAT SERPL-MCNC: 0.81 MG/DL — SIGNIFICANT CHANGE UP (ref 0.5–1.3)
GLUCOSE SERPL-MCNC: 127 MG/DL — HIGH (ref 70–99)
HCT VFR BLD CALC: 42.6 % — SIGNIFICANT CHANGE UP (ref 39–50)
HGB BLD-MCNC: 14.4 G/DL — SIGNIFICANT CHANGE UP (ref 13–17)
MAGNESIUM SERPL-MCNC: 2.1 MG/DL — SIGNIFICANT CHANGE UP (ref 1.6–2.6)
MCHC RBC-ENTMCNC: 30.7 PG — SIGNIFICANT CHANGE UP (ref 27–34)
MCHC RBC-ENTMCNC: 33.8 % — SIGNIFICANT CHANGE UP (ref 32–36)
MCV RBC AUTO: 90.8 FL — SIGNIFICANT CHANGE UP (ref 80–100)
NRBC # FLD: 0 K/UL — SIGNIFICANT CHANGE UP (ref 0–0)
PHOSPHATE SERPL-MCNC: 2.9 MG/DL — SIGNIFICANT CHANGE UP (ref 2.5–4.5)
PLATELET # BLD AUTO: 322 K/UL — SIGNIFICANT CHANGE UP (ref 150–400)
PMV BLD: 10.8 FL — SIGNIFICANT CHANGE UP (ref 7–13)
POTASSIUM SERPL-MCNC: 3.7 MMOL/L — SIGNIFICANT CHANGE UP (ref 3.5–5.3)
POTASSIUM SERPL-SCNC: 3.7 MMOL/L — SIGNIFICANT CHANGE UP (ref 3.5–5.3)
RBC # BLD: 4.69 M/UL — SIGNIFICANT CHANGE UP (ref 4.2–5.8)
RBC # FLD: 13.2 % — SIGNIFICANT CHANGE UP (ref 10.3–14.5)
SODIUM SERPL-SCNC: 133 MMOL/L — LOW (ref 135–145)
WBC # BLD: 16.05 K/UL — HIGH (ref 3.8–10.5)
WBC # FLD AUTO: 16.05 K/UL — HIGH (ref 3.8–10.5)

## 2019-05-04 PROCEDURE — 99233 SBSQ HOSP IP/OBS HIGH 50: CPT | Mod: GC

## 2019-05-04 RX ORDER — APIXABAN 2.5 MG/1
2.5 TABLET, FILM COATED ORAL EVERY 12 HOURS
Qty: 0 | Refills: 0 | Status: DISCONTINUED | OUTPATIENT
Start: 2019-05-04 | End: 2019-05-07

## 2019-05-04 RX ORDER — HYDROMORPHONE HYDROCHLORIDE 2 MG/ML
1 INJECTION INTRAMUSCULAR; INTRAVENOUS; SUBCUTANEOUS EVERY 6 HOURS
Qty: 0 | Refills: 0 | Status: DISCONTINUED | OUTPATIENT
Start: 2019-05-04 | End: 2019-05-07

## 2019-05-04 RX ORDER — SODIUM CHLORIDE 9 MG/ML
500 INJECTION INTRAMUSCULAR; INTRAVENOUS; SUBCUTANEOUS ONCE
Qty: 0 | Refills: 0 | Status: COMPLETED | OUTPATIENT
Start: 2019-05-04 | End: 2019-05-04

## 2019-05-04 RX ADMIN — ATORVASTATIN CALCIUM 10 MILLIGRAM(S): 80 TABLET, FILM COATED ORAL at 21:14

## 2019-05-04 RX ADMIN — PIPERACILLIN AND TAZOBACTAM 25 GRAM(S): 4; .5 INJECTION, POWDER, LYOPHILIZED, FOR SOLUTION INTRAVENOUS at 16:57

## 2019-05-04 RX ADMIN — PIPERACILLIN AND TAZOBACTAM 25 GRAM(S): 4; .5 INJECTION, POWDER, LYOPHILIZED, FOR SOLUTION INTRAVENOUS at 00:04

## 2019-05-04 RX ADMIN — PIPERACILLIN AND TAZOBACTAM 25 GRAM(S): 4; .5 INJECTION, POWDER, LYOPHILIZED, FOR SOLUTION INTRAVENOUS at 09:13

## 2019-05-04 RX ADMIN — APIXABAN 2.5 MILLIGRAM(S): 2.5 TABLET, FILM COATED ORAL at 17:05

## 2019-05-04 RX ADMIN — SODIUM CHLORIDE 250 MILLILITER(S): 9 INJECTION INTRAMUSCULAR; INTRAVENOUS; SUBCUTANEOUS at 07:32

## 2019-05-04 NOTE — PROGRESS NOTE ADULT - PROBLEM SELECTOR PLAN 2
RUQ concerning for acute cholecystitis. No surgical intervention given likelihood of metastatic dx.   - pain management- dilaudid 0.25mg IV q4h prn- minimal requirements presently

## 2019-05-04 NOTE — PROGRESS NOTE ADULT - PROBLEM SELECTOR PLAN 5
VTE ppx- SCDs for now given possible GB perforation  Diet- advance as tolerated  GOC- pt defers decisions to his daughter Mireille Delatorre [phone]. DNR/I. Hospice eval in progress. VTE ppx- SCDs for now given possible GB perforation  Diet- advance as tolerated  GOC- pt defers decisions to his daughter Mireille Delatorre [phone]. DNR/I. Hospice referral in progress

## 2019-05-04 NOTE — PROGRESS NOTE ADULT - PROBLEM SELECTOR PLAN 3
H/o CVA on Eliquis per daughter; suspect thromboembolic etiology  - EKG to check for afib (irregular HR on exam)  - hold Eliquis for possible intervention H/o CVA on Eliquis per daughter; suspect thromboembolic etiology  - EKG to check for afib (irregular HR on exam)  - resumed on apixaban as patient now s/p procedure

## 2019-05-04 NOTE — PROGRESS NOTE ADULT - ATTENDING COMMENTS
Patient seen and examined, d/w Dr. Pope, agree with above.     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 Only reporting mild pain at site of drain, otherwise states doing OK.

## 2019-05-04 NOTE — PROGRESS NOTE ADULT - ASSESSMENT
89 yo man with HTN, HLD, prior CVA (likely afib as pt is on Eliquis 2.5), sinus node dysfunction s/p PPM presenting from home with RUQ abdominal pain. 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 Also RUQ US notable for possible acute cholecystitis. S/p IR perc efren with drain placed. DNR/I pending dispo to home hospice.

## 2019-05-04 NOTE — PROGRESS NOTE ADULT - PROBLEM SELECTOR PLAN 4
- hold lisinopril and carvedilol given hypotension and possible GB perforation - hold lisinopril and carvedilol given hypotension and possible GB perforation  - BP currently in acceptable range

## 2019-05-04 NOTE — PROGRESS NOTE ADULT - SUBJECTIVE AND OBJECTIVE BOX
CHIEF COMPLAINT: Patient is a 89y old  Male who presents with a chief complaint of RUQ pain (03 May 2019 14:08)      SUBJECTIVE / OVERNIGHT EVENTS: S/p IR for perc efren yesterday afternoon. No acute events overnight. Pt seen and examined at bedside.      MEDICATIONS:  MEDICATIONS  (STANDING):  atorvastatin 10 milliGRAM(s) Oral at bedtime  piperacillin/tazobactam IVPB. 3.375 Gram(s) IV Intermittent every 8 hours  polyethylene glycol 3350 17 Gram(s) Oral daily  sodium chloride 0.9% Bolus 500 milliLiter(s) IV Bolus once    MEDICATIONS  (PRN):  HYDROmorphone  Injectable 0.25 milliGRAM(s) IV Push every 4 hours PRN Severe Pain (7 - 10)        OBJECTIVE:  Vital Signs Last 24 Hrs  T(C): 36.8 (04 May 2019 05:21), Max: 36.8 (03 May 2019 21:44)  T(F): 98.2 (04 May 2019 05:21), Max: 98.3 (03 May 2019 21:44)  HR: 96 (04 May 2019 05:21) (83 - 96)  BP: 98/66 (04 May 2019 05:21) (98/66 - 103/69)  BP(mean): --  RR: 16 (04 May 2019 05:21) (16 - 16)  SpO2: 96% (04 May 2019 05:21) (94% - 96%)  CAPILLARY BLOOD GLUCOSE        I&O's Summary        PHYSICAL EXAM:  GENERAL: NAD, pleasant elderly man, [place]  HEENT: EOMI, PERRLA, conjunctiva and sclera clear  NECK: Supple, No JVD  CHEST/LUNG: CTABL  HEART: RRR; S1 S2, No murmurs, rubs, or gallops  ABDOMEN: Soft, TTP RUQ, no rebound, Nondistended; BS x4, biliary drain in place  EXTREMITIES:  2+ Pulses, No edema  NEUROLOGY: AAOx3  SKIN: No rashes or lesions    LABS:                        14.3   17.94 )-----------( 325      ( 03 May 2019 05:50 )             42.6     05-03    132<L>  |  102  |  19  ----------------------------<  144<H>  3.5   |  20<L>  |  0.84    Ca    8.4      03 May 2019 05:50  Phos  2.6     05-03  Mg     2.1     05-03      PT/INR - ( 03 May 2019 05:50 )   PT: 14.8 SEC;   INR: 1.29          PTT - ( 03 May 2019 05:50 )  PTT:26.6 SEC          Culture - Surg Site Aerob/Anaer w/Gm St (collected 03 May 2019 16:36)  Source: DRAINAGE CHIEF COMPLAINT: Patient is a 89y old  Male who presents with a chief complaint of RUQ pain (03 May 2019 14:08)      SUBJECTIVE / OVERNIGHT EVENTS: S/p IR for perc efren yesterday afternoon. No acute events overnight. Pt seen and examined at bedside. Pt endorses mild RUQ pain at drain site this morning, refuses pain meds. No new complaints. Eager to go home.       MEDICATIONS:  MEDICATIONS  (STANDING):  atorvastatin 10 milliGRAM(s) Oral at bedtime  piperacillin/tazobactam IVPB. 3.375 Gram(s) IV Intermittent every 8 hours  polyethylene glycol 3350 17 Gram(s) Oral daily  sodium chloride 0.9% Bolus 500 milliLiter(s) IV Bolus once    MEDICATIONS  (PRN):  HYDROmorphone  Injectable 0.25 milliGRAM(s) IV Push every 4 hours PRN Severe Pain (7 - 10)        OBJECTIVE:  Vital Signs Last 24 Hrs  T(C): 36.8 (04 May 2019 05:21), Max: 36.8 (03 May 2019 21:44)  T(F): 98.2 (04 May 2019 05:21), Max: 98.3 (03 May 2019 21:44)  HR: 96 (04 May 2019 05:21) (83 - 96)  BP: 98/66 (04 May 2019 05:21) (98/66 - 103/69)  BP(mean): --  RR: 16 (04 May 2019 05:21) (16 - 16)  SpO2: 96% (04 May 2019 05:21) (94% - 96%)  CAPILLARY BLOOD GLUCOSE        I&O's Summary        PHYSICAL EXAM:  GENERAL: NAD, pleasant elderly man, [place]  HEENT: EOMI, PERRLA, conjunctiva and sclera clear  NECK: Supple, No JVD  CHEST/LUNG: CTABL  HEART: RRR; S1 S2, No murmurs, rubs, or gallops  ABDOMEN: Soft, TTP RUQ, no rebound, Nondistended; BS x4, biliary drain in place RUQ, covered w dressing, no erythema or leaking at site, minimal output in drain  EXTREMITIES:  2+ Pulses, No edema  NEUROLOGY: AAOx3  SKIN: No rashes or lesions    LABS:                        14.3   17.94 )-----------( 325      ( 03 May 2019 05:50 )             42.6     05-03    132<L>  |  102  |  19  ----------------------------<  144<H>  3.5   |  20<L>  |  0.84    Ca    8.4      03 May 2019 05:50  Phos  2.6     05-03  Mg     2.1     05-03      PT/INR - ( 03 May 2019 05:50 )   PT: 14.8 SEC;   INR: 1.29          PTT - ( 03 May 2019 05:50 )  PTT:26.6 SEC          Culture - Surg Site Aerob/Anaer w/Gm St (collected 03 May 2019 16:36)  Source: DRAINAGE CHIEF COMPLAINT: Patient is a 89y old  Male who presents with a chief complaint of RUQ pain (03 May 2019 14:08)      SUBJECTIVE / OVERNIGHT EVENTS: S/p IR for perc efren yesterday afternoon. No acute events overnight. Pt seen and examined at bedside. Pt endorses mild RUQ pain at drain site this morning, refuses pain meds. No new complaints. Eager to go home.       MEDICATIONS:  MEDICATIONS  (STANDING):  atorvastatin 10 milliGRAM(s) Oral at bedtime  piperacillin/tazobactam IVPB. 3.375 Gram(s) IV Intermittent every 8 hours  polyethylene glycol 3350 17 Gram(s) Oral daily  sodium chloride 0.9% Bolus 500 milliLiter(s) IV Bolus once    MEDICATIONS  (PRN):  HYDROmorphone  Injectable 0.25 milliGRAM(s) IV Push every 4 hours PRN Severe Pain (7 - 10)        OBJECTIVE:  Vital Signs Last 24 Hrs  T(C): 36.8 (04 May 2019 05:21), Max: 36.8 (03 May 2019 21:44)  T(F): 98.2 (04 May 2019 05:21), Max: 98.3 (03 May 2019 21:44)  HR: 96 (04 May 2019 05:21) (83 - 96)  BP: 98/66 (04 May 2019 05:21) (98/66 - 103/69)  BP(mean): --  RR: 16 (04 May 2019 05:21) (16 - 16)  SpO2: 96% (04 May 2019 05:21) (94% - 96%)  CAPILLARY BLOOD GLUCOSE        I&O's Summary        PHYSICAL EXAM:  GENERAL: NAD, pleasant elderly man, [place]  HEENT: EOMI, PERRLA, conjunctiva and sclera clear  NECK: Supple, No JVD  CHEST/LUNG: CTABL  HEART: RRR; S1 S2, No murmurs, rubs, or gallops  ABDOMEN: Soft, TTP RUQ, no rebound, Nondistended; BS x4, biliary drain in place RUQ, covered w dressing, no erythema or leaking at site, minimal output in drain  EXTREMITIES:  2+ Pulses, No edema  NEUROLOGY: AAOx3  SKIN: No rashes or lesions    LABS:                        14.3   17.94 )-----------( 325      ( 03 May 2019 05:50 )             42.6     05-03    132<L>  |  102  |  19  ----------------------------<  144<H>  3.5   |  20<L>  |  0.84    Ca    8.4      03 May 2019 05:50  Phos  2.6     05-03  Mg     2.1     05-03      PT/INR - ( 03 May 2019 05:50 )   PT: 14.8 SEC;   INR: 1.29          PTT - ( 03 May 2019 05:50 )  PTT:26.6 SEC          Culture - Surg Site Aerob/Anaer w/Gm St (collected 03 May 2019 16:36)  Source: DRAINAGE      Consultant notes reviewed: ID

## 2019-05-04 NOTE — PROGRESS NOTE ADULT - PROBLEM SELECTOR PLAN 1
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 Pt is DNR/I, hospice care eval in progress.  - s/p IR perc efren w drain on 5/3 for source control  - c/w zosyn (started 4/30). ID following.

## 2019-05-05 LAB
ANION GAP SERPL CALC-SCNC: 12 MMO/L — SIGNIFICANT CHANGE UP (ref 7–14)
BACTERIA BLD CULT: SIGNIFICANT CHANGE UP
BACTERIA BLD CULT: SIGNIFICANT CHANGE UP
BUN SERPL-MCNC: 16 MG/DL — SIGNIFICANT CHANGE UP (ref 7–23)
CALCIUM SERPL-MCNC: 8.3 MG/DL — LOW (ref 8.4–10.5)
CHLORIDE SERPL-SCNC: 101 MMOL/L — SIGNIFICANT CHANGE UP (ref 98–107)
CO2 SERPL-SCNC: 22 MMOL/L — SIGNIFICANT CHANGE UP (ref 22–31)
CREAT SERPL-MCNC: 0.84 MG/DL — SIGNIFICANT CHANGE UP (ref 0.5–1.3)
GLUCOSE SERPL-MCNC: 138 MG/DL — HIGH (ref 70–99)
HCT VFR BLD CALC: 40.7 % — SIGNIFICANT CHANGE UP (ref 39–50)
HGB BLD-MCNC: 13.4 G/DL — SIGNIFICANT CHANGE UP (ref 13–17)
MAGNESIUM SERPL-MCNC: 2 MG/DL — SIGNIFICANT CHANGE UP (ref 1.6–2.6)
MCHC RBC-ENTMCNC: 29.6 PG — SIGNIFICANT CHANGE UP (ref 27–34)
MCHC RBC-ENTMCNC: 32.9 % — SIGNIFICANT CHANGE UP (ref 32–36)
MCV RBC AUTO: 90 FL — SIGNIFICANT CHANGE UP (ref 80–100)
NRBC # FLD: 0.02 K/UL — SIGNIFICANT CHANGE UP (ref 0–0)
PHOSPHATE SERPL-MCNC: 3.2 MG/DL — SIGNIFICANT CHANGE UP (ref 2.5–4.5)
PLATELET # BLD AUTO: 360 K/UL — SIGNIFICANT CHANGE UP (ref 150–400)
PMV BLD: 11 FL — SIGNIFICANT CHANGE UP (ref 7–13)
POTASSIUM SERPL-MCNC: 3.5 MMOL/L — SIGNIFICANT CHANGE UP (ref 3.5–5.3)
POTASSIUM SERPL-SCNC: 3.5 MMOL/L — SIGNIFICANT CHANGE UP (ref 3.5–5.3)
RBC # BLD: 4.52 M/UL — SIGNIFICANT CHANGE UP (ref 4.2–5.8)
RBC # FLD: 13.2 % — SIGNIFICANT CHANGE UP (ref 10.3–14.5)
SODIUM SERPL-SCNC: 135 MMOL/L — SIGNIFICANT CHANGE UP (ref 135–145)
WBC # BLD: 17.34 K/UL — HIGH (ref 3.8–10.5)
WBC # FLD AUTO: 17.34 K/UL — HIGH (ref 3.8–10.5)

## 2019-05-05 PROCEDURE — 99233 SBSQ HOSP IP/OBS HIGH 50: CPT | Mod: GC

## 2019-05-05 RX ADMIN — PIPERACILLIN AND TAZOBACTAM 25 GRAM(S): 4; .5 INJECTION, POWDER, LYOPHILIZED, FOR SOLUTION INTRAVENOUS at 17:49

## 2019-05-05 RX ADMIN — PIPERACILLIN AND TAZOBACTAM 25 GRAM(S): 4; .5 INJECTION, POWDER, LYOPHILIZED, FOR SOLUTION INTRAVENOUS at 23:34

## 2019-05-05 RX ADMIN — PIPERACILLIN AND TAZOBACTAM 25 GRAM(S): 4; .5 INJECTION, POWDER, LYOPHILIZED, FOR SOLUTION INTRAVENOUS at 00:10

## 2019-05-05 RX ADMIN — APIXABAN 2.5 MILLIGRAM(S): 2.5 TABLET, FILM COATED ORAL at 17:50

## 2019-05-05 RX ADMIN — APIXABAN 2.5 MILLIGRAM(S): 2.5 TABLET, FILM COATED ORAL at 06:25

## 2019-05-05 RX ADMIN — ATORVASTATIN CALCIUM 10 MILLIGRAM(S): 80 TABLET, FILM COATED ORAL at 21:14

## 2019-05-05 RX ADMIN — PIPERACILLIN AND TAZOBACTAM 25 GRAM(S): 4; .5 INJECTION, POWDER, LYOPHILIZED, FOR SOLUTION INTRAVENOUS at 09:39

## 2019-05-05 NOTE — PROGRESS NOTE ADULT - PROBLEM SELECTOR PLAN 4
- hold lisinopril and carvedilol given hypotension and possible GB perforation  - BP currently in acceptable range

## 2019-05-05 NOTE — PROGRESS NOTE ADULT - PROBLEM SELECTOR PLAN 3
H/o CVA on Eliquis per daughter; suspect thromboembolic etiology  - resumed on apixaban as patient now s/p procedure

## 2019-05-05 NOTE — PROGRESS NOTE ADULT - PROBLEM SELECTOR PLAN 5
VTE ppx- Eliquis  Diet- advance as tolerated  GOC- pt defers decisions to his daughter Mireille Delatorre [phone]. DNR/I. Hospice referral in progress

## 2019-05-05 NOTE — PROGRESS NOTE ADULT - PROBLEM SELECTOR PLAN 2
RUQ concerning for acute cholecystitis. No surgical intervention given likelihood of metastatic dx.   - pain management- dilaudid prn- minimal requirements presently

## 2019-05-05 NOTE — PROGRESS NOTE ADULT - PROBLEM SELECTOR PLAN 1
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 Pt is DNR/I, hospice care eval in progress.  - s/p IR perc efren w drain on 5/3 for source control  - c/w zosyn (started 4/30). ID following. 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 Pt is DNR/I, hospice care eval in progress.  - s/p IR perc efren w drain on 5/3 for source control  - c/w zosyn (started 4/30). ID following. f/u drain cultures (E coli) sensitivities

## 2019-05-05 NOTE — PROGRESS NOTE ADULT - ASSESSMENT
89 yo man with HTN, HLD, prior CVA (likely afib as pt is on Eliquis 2.5), sinus node dysfunction s/p PPM presenting from home with RUQ abdominal pain. 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 Also RUQ US notable for possible acute cholecystitis. S/p IR perc efren with drain placed. DNR/I pending dispo to home hospice.

## 2019-05-05 NOTE — PROGRESS NOTE ADULT - SUBJECTIVE AND OBJECTIVE BOX
CHIEF COMPLAINT: Patient is a 89y old  Male who presents with a chief complaint of RUQ pain (04 May 2019 07:15)      SUBJECTIVE / OVERNIGHT EVENTS: No acute events overnight. Pt seen and examined at bedside.      MEDICATIONS:  MEDICATIONS  (STANDING):  apixaban 2.5 milliGRAM(s) Oral every 12 hours  atorvastatin 10 milliGRAM(s) Oral at bedtime  piperacillin/tazobactam IVPB. 3.375 Gram(s) IV Intermittent every 8 hours    MEDICATIONS  (PRN):  HYDROmorphone   Tablet 1 milliGRAM(s) Oral every 6 hours PRN Severe Pain (7 - 10)        OBJECTIVE:  Vital Signs Last 24 Hrs  T(C): 36.6 (05 May 2019 06:23), Max: 37.2 (04 May 2019 21:42)  T(F): 97.8 (05 May 2019 06:23), Max: 98.9 (04 May 2019 21:42)  HR: 68 (05 May 2019 06:23) (68 - 89)  BP: 115/85 (05 May 2019 06:23) (104/63 - 115/85)  BP(mean): --  RR: 17 (05 May 2019 06:23) (17 - 19)  SpO2: 97% (05 May 2019 06:23) (95% - 97%)  CAPILLARY BLOOD GLUCOSE        I&O's Summary    04 May 2019 07:01  -  05 May 2019 07:00  --------------------------------------------------------  IN: 0 mL / OUT: 30 mL / NET: -30 mL          PHYSICAL EXAM:  GENERAL: NAD, pleasant elderly man, [place]  HEENT: EOMI, PERRLA, conjunctiva and sclera clear  NECK: Supple, No JVD  CHEST/LUNG: CTABL  HEART: RRR; S1 S2, No murmurs, rubs, or gallops  ABDOMEN: Soft, TTP RUQ, no rebound, Nondistended; BS x4, biliary drain in place RUQ, covered w dressing, no erythema or leaking at site, minimal output in drain  EXTREMITIES:  2+ Pulses, No edema  NEUROLOGY: AAOx3  SKIN: No rashes or lesions    LABS:                        14.4   16.05 )-----------( 322      ( 04 May 2019 06:08 )             42.6     05-04    133<L>  |  97<L>  |  17  ----------------------------<  127<H>  3.7   |  21<L>  |  0.81    Ca    8.3<L>      04 May 2019 06:08  Phos  2.9     05-04  Mg     2.1     05-04                Culture - Surg Site Aerob/Anaer w/Gm St (collected 03 May 2019 16:36)  Source: DRAINAGE CHIEF COMPLAINT: Patient is a 89y old  Male who presents with a chief complaint of RUQ pain (04 May 2019 07:15)      SUBJECTIVE / OVERNIGHT EVENTS: No acute events overnight. Pt seen and examined at bedside. Pt appears well this morning, sitting up and eating breakfast. Endorses mild tenderness at drain site, but refuses pain medications. Very eager for discharge home.       MEDICATIONS:  MEDICATIONS  (STANDING):  apixaban 2.5 milliGRAM(s) Oral every 12 hours  atorvastatin 10 milliGRAM(s) Oral at bedtime  piperacillin/tazobactam IVPB. 3.375 Gram(s) IV Intermittent every 8 hours    MEDICATIONS  (PRN):  HYDROmorphone   Tablet 1 milliGRAM(s) Oral every 6 hours PRN Severe Pain (7 - 10)        OBJECTIVE:  Vital Signs Last 24 Hrs  T(C): 36.6 (05 May 2019 06:23), Max: 37.2 (04 May 2019 21:42)  T(F): 97.8 (05 May 2019 06:23), Max: 98.9 (04 May 2019 21:42)  HR: 68 (05 May 2019 06:23) (68 - 89)  BP: 115/85 (05 May 2019 06:23) (104/63 - 115/85)  BP(mean): --  RR: 17 (05 May 2019 06:23) (17 - 19)  SpO2: 97% (05 May 2019 06:23) (95% - 97%)  CAPILLARY BLOOD GLUCOSE        I&O's Summary    04 May 2019 07:01  -  05 May 2019 07:00  --------------------------------------------------------  IN: 0 mL / OUT: 30 mL / NET: -30 mL          PHYSICAL EXAM:  GENERAL: NAD, pleasant elderly man, [place]  HEENT: EOMI, PERRLA, conjunctiva and sclera clear  NECK: Supple, No JVD  CHEST/LUNG: CTABL  HEART: RRR; S1 S2, No murmurs, rubs, or gallops  ABDOMEN: Soft, TTP RUQ, no rebound, Nondistended; BS x4, biliary drain in place RUQ, covered w dressing, no erythema or leaking at site, minimal output in drain  EXTREMITIES:  2+ Pulses, No edema  NEUROLOGY: AAOx3  SKIN: No rashes or lesions    LABS:                        14.4   16.05 )-----------( 322      ( 04 May 2019 06:08 )             42.6     05-04    133<L>  |  97<L>  |  17  ----------------------------<  127<H>  3.7   |  21<L>  |  0.81    Ca    8.3<L>      04 May 2019 06:08  Phos  2.9     05-04  Mg     2.1     05-04                Culture - Surg Site Aerob/Anaer w/Gm St (collected 03 May 2019 16:36)  Source: DRAINAGE CHIEF COMPLAINT: Patient is a 89y old  Male who presents with a chief complaint of RUQ pain (04 May 2019 07:15)      SUBJECTIVE / OVERNIGHT EVENTS: No acute events overnight. Pt seen and examined at bedside. Pt appears well this morning, sitting up and eating breakfast. Endorses mild tenderness at drain site, but refuses pain medications. Very eager for discharge home.       MEDICATIONS:  MEDICATIONS  (STANDING):  apixaban 2.5 milliGRAM(s) Oral every 12 hours  atorvastatin 10 milliGRAM(s) Oral at bedtime  piperacillin/tazobactam IVPB. 3.375 Gram(s) IV Intermittent every 8 hours    MEDICATIONS  (PRN):  HYDROmorphone   Tablet 1 milliGRAM(s) Oral every 6 hours PRN Severe Pain (7 - 10)        OBJECTIVE:  Vital Signs Last 24 Hrs  T(C): 36.6 (05 May 2019 06:23), Max: 37.2 (04 May 2019 21:42)  T(F): 97.8 (05 May 2019 06:23), Max: 98.9 (04 May 2019 21:42)  HR: 68 (05 May 2019 06:23) (68 - 89)  BP: 115/85 (05 May 2019 06:23) (104/63 - 115/85)  BP(mean): --  RR: 17 (05 May 2019 06:23) (17 - 19)  SpO2: 97% (05 May 2019 06:23) (95% - 97%)  CAPILLARY BLOOD GLUCOSE        I&O's Summary    04 May 2019 07:01  -  05 May 2019 07:00  --------------------------------------------------------  IN: 0 mL / OUT: 30 mL / NET: -30 mL          PHYSICAL EXAM:  GENERAL: NAD, pleasant elderly man, [place]  HEENT: EOMI, PERRLA, conjunctiva and sclera clear  NECK: Supple, No JVD  CHEST/LUNG: CTABL  HEART: RRR; S1 S2, No murmurs, rubs, or gallops  ABDOMEN: Soft, TTP RUQ, no rebound, Nondistended; BS x4, biliary drain in place RUQ, covered w dressing, no erythema or leaking at site, minimal output in drain  EXTREMITIES:  2+ Pulses, No edema  NEUROLOGY: AAOx3  SKIN: No rashes or lesions    LABS:                        14.4   16.05 )-----------( 322      ( 04 May 2019 06:08 )             42.6     05-04    133<L>  |  97<L>  |  17  ----------------------------<  127<H>  3.7   |  21<L>  |  0.81    Ca    8.3<L>      04 May 2019 06:08  Phos  2.9     05-04  Mg     2.1     05-04      Culture - Surg Site Aerob/Anaer w/Gm St (collected 03 May 2019 16:36)  Source: DRAINAGE

## 2019-05-05 NOTE — PROGRESS NOTE ADULT - ATTENDING COMMENTS
Patient seen and examined, d/w Dr. 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 coli in drainage culture (sensitivities pending), awaiting arrangement of hospice services. Pain is manageable today, some minimal discomfort from drain site, otherwise doing ok, can't wait to get home.

## 2019-05-06 LAB
-  AMIKACIN: SIGNIFICANT CHANGE UP
-  AMPICILLIN/SULBACTAM: SIGNIFICANT CHANGE UP
-  AMPICILLIN: SIGNIFICANT CHANGE UP
-  AZTREONAM: SIGNIFICANT CHANGE UP
-  CEFAZOLIN: SIGNIFICANT CHANGE UP
-  CEFEPIME: SIGNIFICANT CHANGE UP
-  CEFOXITIN: SIGNIFICANT CHANGE UP
-  CEFTAZIDIME: SIGNIFICANT CHANGE UP
-  CEFTRIAXONE: SIGNIFICANT CHANGE UP
-  CIPROFLOXACIN: SIGNIFICANT CHANGE UP
-  ERTAPENEM: SIGNIFICANT CHANGE UP
-  GENTAMICIN: SIGNIFICANT CHANGE UP
-  IMIPENEM: SIGNIFICANT CHANGE UP
-  LEVOFLOXACIN: SIGNIFICANT CHANGE UP
-  MEROPENEM: SIGNIFICANT CHANGE UP
-  PIPERACILLIN/TAZOBACTAM: SIGNIFICANT CHANGE UP
-  TIGECYCLINE: SIGNIFICANT CHANGE UP
-  TOBRAMYCIN: SIGNIFICANT CHANGE UP
-  TRIMETHOPRIM/SULFAMETHOXAZOLE: SIGNIFICANT CHANGE UP
ANION GAP SERPL CALC-SCNC: 11 MMO/L — SIGNIFICANT CHANGE UP (ref 7–14)
BUN SERPL-MCNC: 19 MG/DL — SIGNIFICANT CHANGE UP (ref 7–23)
CALCIUM SERPL-MCNC: 8.2 MG/DL — LOW (ref 8.4–10.5)
CHLORIDE SERPL-SCNC: 101 MMOL/L — SIGNIFICANT CHANGE UP (ref 98–107)
CO2 SERPL-SCNC: 21 MMOL/L — LOW (ref 22–31)
CREAT SERPL-MCNC: 0.86 MG/DL — SIGNIFICANT CHANGE UP (ref 0.5–1.3)
GLUCOSE SERPL-MCNC: 128 MG/DL — HIGH (ref 70–99)
GRAM STN WND: SIGNIFICANT CHANGE UP
HCT VFR BLD CALC: 40.5 % — SIGNIFICANT CHANGE UP (ref 39–50)
HGB BLD-MCNC: 13.6 G/DL — SIGNIFICANT CHANGE UP (ref 13–17)
MAGNESIUM SERPL-MCNC: 2 MG/DL — SIGNIFICANT CHANGE UP (ref 1.6–2.6)
MCHC RBC-ENTMCNC: 30.7 PG — SIGNIFICANT CHANGE UP (ref 27–34)
MCHC RBC-ENTMCNC: 33.6 % — SIGNIFICANT CHANGE UP (ref 32–36)
MCV RBC AUTO: 91.4 FL — SIGNIFICANT CHANGE UP (ref 80–100)
METHOD TYPE: SIGNIFICANT CHANGE UP
NRBC # FLD: 0 K/UL — SIGNIFICANT CHANGE UP (ref 0–0)
ORGANISM # SPEC MICROSCOPIC CNT: SIGNIFICANT CHANGE UP
ORGANISM # SPEC MICROSCOPIC CNT: SIGNIFICANT CHANGE UP
PHOSPHATE SERPL-MCNC: 3.1 MG/DL — SIGNIFICANT CHANGE UP (ref 2.5–4.5)
PLATELET # BLD AUTO: 382 K/UL — SIGNIFICANT CHANGE UP (ref 150–400)
PMV BLD: 10.7 FL — SIGNIFICANT CHANGE UP (ref 7–13)
POTASSIUM SERPL-MCNC: 3.5 MMOL/L — SIGNIFICANT CHANGE UP (ref 3.5–5.3)
POTASSIUM SERPL-SCNC: 3.5 MMOL/L — SIGNIFICANT CHANGE UP (ref 3.5–5.3)
RBC # BLD: 4.43 M/UL — SIGNIFICANT CHANGE UP (ref 4.2–5.8)
RBC # FLD: 13.2 % — SIGNIFICANT CHANGE UP (ref 10.3–14.5)
SODIUM SERPL-SCNC: 133 MMOL/L — LOW (ref 135–145)
WBC # BLD: 16.81 K/UL — HIGH (ref 3.8–10.5)
WBC # FLD AUTO: 16.81 K/UL — HIGH (ref 3.8–10.5)

## 2019-05-06 PROCEDURE — 99232 SBSQ HOSP IP/OBS MODERATE 35: CPT

## 2019-05-06 PROCEDURE — 99233 SBSQ HOSP IP/OBS HIGH 50: CPT | Mod: GC

## 2019-05-06 RX ORDER — POTASSIUM CHLORIDE 20 MEQ
20 PACKET (EA) ORAL ONCE
Qty: 0 | Refills: 0 | Status: COMPLETED | OUTPATIENT
Start: 2019-05-06 | End: 2019-05-06

## 2019-05-06 RX ADMIN — APIXABAN 2.5 MILLIGRAM(S): 2.5 TABLET, FILM COATED ORAL at 05:39

## 2019-05-06 RX ADMIN — ATORVASTATIN CALCIUM 10 MILLIGRAM(S): 80 TABLET, FILM COATED ORAL at 21:46

## 2019-05-06 RX ADMIN — PIPERACILLIN AND TAZOBACTAM 25 GRAM(S): 4; .5 INJECTION, POWDER, LYOPHILIZED, FOR SOLUTION INTRAVENOUS at 09:57

## 2019-05-06 RX ADMIN — APIXABAN 2.5 MILLIGRAM(S): 2.5 TABLET, FILM COATED ORAL at 17:34

## 2019-05-06 RX ADMIN — Medication 20 MILLIEQUIVALENT(S): at 17:33

## 2019-05-06 RX ADMIN — Medication 1 TABLET(S): at 17:33

## 2019-05-06 NOTE — PROGRESS NOTE ADULT - PROBLEM SELECTOR PLAN 5
VTE ppx- Eliquis  Diet- advance as tolerated  GOC- pt defers decisions to his daughter Mireille Delatorre [phone]. DNR/I. Hospice referral in progress

## 2019-05-06 NOTE — PROGRESS NOTE ADULT - SUBJECTIVE AND OBJECTIVE BOX
CC: Patient is a 89y old  Male who presents with a chief complaint of RUQ pain (06 May 2019 08:40)    ID following for perforated gallbladder    Interval History/ROS: Patient remains with RUQ pain, remains with drain. Denies fever, chills.    Rest of ROS negative.    Allergies  No Known Allergies    ANTIMICROBIALS:  amoxicillin  500 milliGRAM(s)/clavulanate 1 every 12 hours    OTHER MEDS:  apixaban 2.5 milliGRAM(s) Oral every 12 hours  atorvastatin 10 milliGRAM(s) Oral at bedtime  HYDROmorphone   Tablet 1 milliGRAM(s) Oral every 6 hours PRN  potassium chloride    Tablet ER 20 milliEquivalent(s) Oral once    PE:    Vital Signs Last 24 Hrs  T(C): 36.7 (06 May 2019 12:30), Max: 36.7 (06 May 2019 12:30)  T(F): 98 (06 May 2019 12:30), Max: 98 (06 May 2019 12:30)  HR: 78 (06 May 2019 12:30) (76 - 96)  BP: 124/76 (06 May 2019 12:30) (119/86 - 130/83)  BP(mean): --  RR: 17 (06 May 2019 12:30) (17 - 18)  SpO2: 96% (06 May 2019 12:30) (95% - 96%)    Gen: AOx3, NAD, non-toxic  CV: S1+S2 normal, no murmurs  Resp: Clear bilat, no resp distress  Abd: Soft, RUQ tender with drain, +BS  Ext: No LE edema, no wounds  IV/Skin: No thrombophlebitis  Neuro: [place]    LABS:                          13.6   16.81 )-----------( 382      ( 06 May 2019 05:45 )             40.5       05-06    133<L>  |  101  |  19  ----------------------------<  128<H>  3.5   |  21<L>  |  0.86    Ca    8.2<L>      06 May 2019 05:45  Phos  3.1     05-06  Mg     2.0     05-06      MICROBIOLOGY:  v  DRAINAGE  05-03-19 --  --  Escherichia coli      URINE MIDSTREAM  04-30-19 --  --  --      BLOOD PERIPHERAL  04-30-19 --  --  --    RADIOLOGY:  < from: CT Abdomen and Pelvis w/ IV Cont (04.30.19 @ 09:01) >    IMPRESSION:     Diffuse wall thickening of the gallbladder, with apparent discontinuity   at the inferior aspect of the gallbladder and several low-attenuation   collections adjacent to the inferior gallbladder, concerning for   perforation.  A perforated gallbladder neoplasm is considered,   particularly in view of theextensive necrotic retroperitoneal   lymphadenopathy, bilateral adrenal masses, and nodularity of the omentum   in the right upper quadrant, which is suspicious for peritoneal   carcinomatosis.    Normal appendix.    Colonic diverticulosis.    These findings were discussed with Dr. Gross on 4/30/2019 at 9:30 AM by   Dr. Peralta with read back confirmation.        *** END OF ADDENDUM 04/30/2019  ***      PROCEDURE DATE:  Apr 30 2019         INTERPRETATION:  Correction to initial report: There is trace   pericholecystic fluid and perihepatic ascites, suspicious for acute   cholecystitis.    Dr. Parker discussed the findings with Dr. Amaya on April 30, 2019 at 9:12   AM.  Readback was obtained.          ***Please see the addendum at the top of this report. It may contain   additional important information or changes.****        < end of copied text >

## 2019-05-06 NOTE — PROGRESS NOTE ADULT - PROBLEM SELECTOR PLAN 1
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 Pt is DNR/I, hospice care eval in progress.  - s/p IR perc efren w drain on 5/3 for source control  - c/w zosyn (started 4/30). ID following. f/u drain cultures (E coli) sensitivities On CT with new gallbladder mass with radiographic findings suspicious for perforation, a/w omental carcinomatosis and extensive necrotic retroperitoneal LAD  - discussed GOC at length with pt in setting of new likely metastatic disease. Pt is DNR/I, hospice care eval in progress.  - s/p IR perc efren w drain on 5/3 for source control  - c/w zosyn (started 4/30). ID following. Drain cultures w/ E coli pansensitive, ID rec PO augmentin x2wk. 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 Pt is DNR/I, hospice care eval in progress.  - s/p IR perc efren w drain on 5/3 for source control. Plan for drain to stay in place at least 6wk, most likely exchange at 3month as opposed to removal.  - c/w zosyn (started 4/30). ID following. Drain cultures w/ E coli pansensitive, ID rec PO augmentin x2wk.

## 2019-05-06 NOTE — PROGRESS NOTE ADULT - ASSESSMENT
89 year old male with HTN, HLD, CVA, sinus node dysfunction with PPM, presenting with RUQ abdominal pain.    Functional, independent with ADLs  Afebrile  Bandemic on admission  Blood cultures so far no growth  CT A/P with concern for perforated gallbladder, possible neoplasm, necrotic retoperitoneal lymphadenopathy, adrenal masses, and suspicion for peritoneal carcinamatosis  Leukocytosis remains elevated  Remains with RUQ abd pain  s/p cholecystostomy drain with pan sensitive E. coli    Recommend:  -Can transition to augmentin 875/125 mg PO BID for an additional 14 days.  -Continue to monitor WBC

## 2019-05-06 NOTE — PROGRESS NOTE ADULT - SUBJECTIVE AND OBJECTIVE BOX
Manish Milian [phone]    Patient is a 89y old  Male who presents with a chief complaint of RUQ pain (05 May 2019 07:27)    INTERVAL HPI/OVERNIGHT EVENTS:  Afebrile, saturating well overnight. NAEO. This AM, has no complaints, wants to go home.      MEDICATIONS  (STANDING):  apixaban 2.5 milliGRAM(s) Oral every 12 hours  atorvastatin 10 milliGRAM(s) Oral at bedtime  piperacillin/tazobactam IVPB. 3.375 Gram(s) IV Intermittent every 8 hours  potassium chloride    Tablet ER 20 milliEquivalent(s) Oral once    MEDICATIONS  (PRN):  HYDROmorphone   Tablet 1 milliGRAM(s) Oral every 6 hours PRN Severe Pain (7 - 10)      PAST MEDICAL & SURGICAL HISTORY:  Bradycardia  CVA (Cerebral Infarction): No residual signs on exam  GERD (Gastroesophageal Reflux Disease)  History of Carotid Stenosis  Hypercholesteremia  Hypertension  Pacemaker  Pacemaker  IH (Inguinal Hernia): Left  History of Knee Surgery: Left knee in 1970&#x27;s      Vital Signs Last 24 Hrs  T(C): 36.5 (06 May 2019 05:37), Max: 36.6 (05 May 2019 21:35)  T(F): 97.7 (06 May 2019 05:37), Max: 97.9 (05 May 2019 21:35)  HR: 76 (06 May 2019 05:37) (76 - 96)  BP: 119/86 (06 May 2019 05:37) (116/68 - 130/83)  BP(mean): --  RR: 18 (06 May 2019 05:37) (17 - 18)  SpO2: 95% (06 May 2019 05:37) (95% - 98%)      In's and Outs:   05-05-19 @ 07:01  -  05-06-19 @ 07:00  --------------------------------------------------------  IN: 0 mL / OUT: 45 mL / NET: -45 mL      PHYSICAL EXAMINATION:  GENERAL: The patient is awake and alert in no apparent distress. +[place].  HEENT: NCAT. EOMI. Mucous membranes are dry.  NECK: Supple. No JVD.  LUNGS: [x] Clear to auscultation b/l   [x] Unlabored breathing   [ ] Wheezing  [ ] Rales  [ ] Rhonchi  HEART: [x] Normal  [ ] Irregular rhythm  [ ] Tachycardia  [ ] Bradycardia   [ ] Systolic murmur  [ ] Diastolic murmur  [ ] Gallop   ABDOMEN: [ ] Normal  [ ] Hard  [ ] Tender  [ ] Distended [ ] Bowel sounds [x] Biliary drain in place without surrounding tenderness. Dressing c/d/i. No drainage in bag visualized this AM.  GENITOURINARY: [x] Normal  [ ] Incontinent   [ ] Oliguria/Anuria   [ ] [place]  EXTREMITIES: [x] Normal  [ ] Cyanosis  [ ] Edema  NEUROLOGIC: [x] Grossly intact  [ ] Focal neurologic deficits  SKIN: [x] Normal  [ ] Rash   [ ] Ulcers  Musculoskeletal:  [x] Normal   [ ] Generalized weakness  [ ] Bedbound      LABS:                        13.6   16.81 )-----------( 382      ( 06 May 2019 05:45 )             40.5     Hgb Trend: 13.6<--, 13.4<--, 14.4<--, 14.3<--, 14.6<--  05-06    133<L>  |  101  |  19  ----------------------------<  128<H>  3.5   |  21<L>  |  0.86    Ca    8.2<L>      06 May 2019 05:45  Phos  3.1     05-06  Mg     2.0     05-06      Creatinine Trend: 0.86<--, 0.84<--, 0.81<--, 0.84<--, 0.88<--, 1.02<--      MICROBIOLOGY:    Blood cultures:     (collected 04-30-19 @ 10:42)  Source: BLOOD VENOUS     (collected 04-30-19 @ 10:42)  Source: BLOOD PERIPHERAL    Culture - Surg Site Aerob/Anaer w/Gm St (05.03.19 @ 16:36)    Gram Stain Wound:   NOS^No Organisms Seen  WBC^White Blood Cells  QNTY CELLS IN GRAM STAIN: MANY (4+)    Culture - Surgical Site:   EC^Escherichia coli    Specimen Source: DRAINAGE

## 2019-05-06 NOTE — PROGRESS NOTE ADULT - ATTENDING COMMENTS
Patient seen and examined at bedside. Patient up and tolerating lunch. Reports slight (2/10) pain in his RUQ, no nausea, vomiting. Passing flatus. Wants to go home.    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 coli. Will d/w ID re: duration and type of abx and patient would very much like to go home with hospice services as soon as is possible. c/w Zosyn for now.     2.)hx CVA: c/w Eliquis for now. Currently in NSR. BB held in the setting of hypotension from possible sepsis from acute cholecystitis.

## 2019-05-07 ENCOUNTER — TRANSCRIPTION ENCOUNTER (OUTPATIENT)
Age: 84
End: 2019-05-07

## 2019-05-07 VITALS
RESPIRATION RATE: 17 BRPM | SYSTOLIC BLOOD PRESSURE: 133 MMHG | OXYGEN SATURATION: 97 % | TEMPERATURE: 98 F | DIASTOLIC BLOOD PRESSURE: 74 MMHG | HEART RATE: 61 BPM

## 2019-05-07 PROCEDURE — 99239 HOSP IP/OBS DSCHRG MGMT >30: CPT

## 2019-05-07 PROCEDURE — 99232 SBSQ HOSP IP/OBS MODERATE 35: CPT

## 2019-05-07 RX ORDER — LISINOPRIL 2.5 MG/1
1 TABLET ORAL
Qty: 0 | Refills: 0 | COMMUNITY

## 2019-05-07 RX ORDER — APIXABAN 2.5 MG/1
1 TABLET, FILM COATED ORAL
Qty: 60 | Refills: 0 | OUTPATIENT
Start: 2019-05-07 | End: 2019-06-05

## 2019-05-07 RX ORDER — DOCUSATE SODIUM 100 MG
1 CAPSULE ORAL
Qty: 30 | Refills: 0 | OUTPATIENT
Start: 2019-05-07 | End: 2019-06-05

## 2019-05-07 RX ORDER — POLYETHYLENE GLYCOL 3350 17 G/17G
17 POWDER, FOR SOLUTION ORAL
Qty: 100 | Refills: 0 | OUTPATIENT
Start: 2019-05-07 | End: 2019-05-13

## 2019-05-07 RX ORDER — HYDROMORPHONE HYDROCHLORIDE 2 MG/ML
0.5 INJECTION INTRAMUSCULAR; INTRAVENOUS; SUBCUTANEOUS
Qty: 28 | Refills: 0 | OUTPATIENT
Start: 2019-05-07 | End: 2019-05-20

## 2019-05-07 RX ORDER — APIXABAN 2.5 MG/1
1 TABLET, FILM COATED ORAL
Qty: 0 | Refills: 0 | COMMUNITY

## 2019-05-07 RX ORDER — CARVEDILOL PHOSPHATE 80 MG/1
1 CAPSULE, EXTENDED RELEASE ORAL
Qty: 0 | Refills: 0 | COMMUNITY

## 2019-05-07 RX ORDER — SODIUM CHLORIDE 9 MG/ML
5 INJECTION INTRAMUSCULAR; INTRAVENOUS; SUBCUTANEOUS
Qty: 30 | Refills: 0 | OUTPATIENT
Start: 2019-05-07 | End: 2019-06-05

## 2019-05-07 RX ORDER — HYDROMORPHONE HYDROCHLORIDE 2 MG/ML
1 INJECTION INTRAMUSCULAR; INTRAVENOUS; SUBCUTANEOUS
Qty: 0 | Refills: 0 | COMMUNITY

## 2019-05-07 RX ADMIN — Medication 1 TABLET(S): at 05:10

## 2019-05-07 RX ADMIN — APIXABAN 2.5 MILLIGRAM(S): 2.5 TABLET, FILM COATED ORAL at 05:10

## 2019-05-07 NOTE — PROGRESS NOTE ADULT - NSHPATTENDINGPLANDISCUSS_GEN_ALL_CORE
primary team
Patient, HS1, SW/CM
Patient, Family, HS1, SW/CM
pt, daughters, resident
pt, resident, daughter
no JVD/normal/supple

## 2019-05-07 NOTE — PROGRESS NOTE ADULT - PROBLEM SELECTOR PROBLEM 2
Acute cholecystitis

## 2019-05-07 NOTE — PROGRESS NOTE ADULT - PROBLEM SELECTOR PROBLEM 1
Gallbladder mass

## 2019-05-07 NOTE — PROGRESS NOTE ADULT - ATTENDING COMMENTS
Patient seen and examined. Feeling well, some slight soreness at cholecystostomy tube site. Very much wants to go home. Will complete 13 more days course of abx with Augmentin as per ID recs. Medically stable for dc home with hospice.    TIME SPENT ON DISCHARGE PLANNINmins

## 2019-05-07 NOTE — PROGRESS NOTE ADULT - PROVIDER SPECIALTY LIST ADULT
Infectious Disease
Internal Medicine
Surgery
Internal Medicine

## 2019-05-07 NOTE — DISCHARGE NOTE NURSING/CASE MANAGEMENT/SOCIAL WORK - NSDCDPATPORTLINK_GEN_ALL_CORE
You can access the [facility] Portal, offered by [facility], by registering with the following website: [email or web address]

## 2019-05-07 NOTE — DISCHARGE NOTE NURSING/CASE MANAGEMENT/SOCIAL WORK - NSDCPEPTSTRK_GEN_ALL_CORE
Stroke support groups for patients, families, and friends/Prescribed medications/Risk factors for stroke/Stroke education booklet/Stroke warning signs and symptoms/Signs and symptoms of stroke/Call 911 for stroke/Need for follow up after discharge

## 2019-05-07 NOTE — PROGRESS NOTE ADULT - PROBLEM SELECTOR PLAN 5
VTE ppx- Eliquis  Diet- advance as tolerated  GOC- pt defers decisions to his daughter Mireille Delatorre [phone]. DNR/I. Hospice referral in progress

## 2019-05-07 NOTE — PROGRESS NOTE ADULT - PROBLEM SELECTOR PLAN 1
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 Pt is DNR/I, hospice care eval in progress.  - s/p IR perc efren w drain on 5/3 for source control. Plan for drain to stay in place at least 6wk, most likely exchange at 3month as opposed to removal.  - S/p zosyn (4/30-5/6). ID following. Drain cultures w/ E coli pansensitive, ID rec PO augmentin x2wk.

## 2019-05-07 NOTE — PROGRESS NOTE ADULT - SUBJECTIVE AND OBJECTIVE BOX
CC: Patient is a 89y old  Male who presents with a chief complaint of RUQ pain (07 May 2019 07:03)    ID following for perforated gallbladder    Interval History/ROS: Patient with improvement in RUQ pain, remains with drain. Denies fever, chills.    Rest of ROS negative.    Allergies  No Known Allergies    ANTIMICROBIALS:  amoxicillin  875 milliGRAM(s)/clavulanate 1 every 12 hours    OTHER MEDS:  apixaban 2.5 milliGRAM(s) Oral every 12 hours  atorvastatin 10 milliGRAM(s) Oral at bedtime  HYDROmorphone   Tablet 1 milliGRAM(s) Oral every 6 hours PRN    PE:    Vital Signs Last 24 Hrs  T(C): 36.9 (07 May 2019 12:25), Max: 37.2 (06 May 2019 22:06)  T(F): 98.4 (07 May 2019 12:25), Max: 99 (06 May 2019 22:06)  HR: 61 (07 May 2019 12:25) (61 - 76)  BP: 133/74 (07 May 2019 12:25) (113/61 - 133/74)  BP(mean): --  RR: 17 (07 May 2019 12:25) (16 - 17)  SpO2: 97% (07 May 2019 12:25) (96% - 97%)    Gen: AOx3, NAD, non-toxic, pleasant  CV: S1+S2 normal, no murmurs  Resp: Clear bilat, no resp distress  Abd: Soft, RUQ tenderness, +BS, drain in place  Ext: No LE edema, no wounds  : No [place]  IV/Skin: No thrombophlebitis  Neuro: no focal deficits    LABS:                          13.6   16.81 )-----------( 382      ( 06 May 2019 05:45 )             40.5       05-06    133<L>  |  101  |  19  ----------------------------<  128<H>  3.5   |  21<L>  |  0.86    Ca    8.2<L>      06 May 2019 05:45  Phos  3.1     05-06  Mg     2.0     05-06    MICROBIOLOGY:  v  DRAINAGE  05-03-19 --  --  Escherichia coli      URINE MIDSTREAM  04-30-19 --  --  --      BLOOD PERIPHERAL  04-30-19 --  --  --    RADIOLOGY:    < from: CT Abdomen and Pelvis w/ IV Cont (04.30.19 @ 09:01) >  IMPRESSION:     Diffuse wall thickening of the gallbladder, with apparent discontinuity   at the inferior aspect of the gallbladder and several low-attenuation   collections adjacent to the inferior gallbladder, concerning for   perforation.  A perforated gallbladder neoplasm is considered,   particularly in view of theextensive necrotic retroperitoneal   lymphadenopathy, bilateral adrenal masses, and nodularity of the omentum   in the right upper quadrant, which is suspicious for peritoneal   carcinomatosis.    Normal appendix.    Colonic diverticulosis.    These findings were discussed with Dr. Gross on 4/30/2019 at 9:30 AM by   Dr. Peralta with read back confirmation.        *** END OF ADDENDUM 04/30/2019  ***      PROCEDURE DATE:  Apr 30 2019         INTERPRETATION:  Correction to initial report: There is trace   pericholecystic fluid and perihepatic ascites, suspicious for acute   cholecystitis.    Dr. Parker discussed the findings with Dr. Amaya on April 30, 2019 at 9:12   AM.  Readback was obtained.          ***Please see the addendum at the top of this report. It may contain   additional important information or changes.****      < end of copied text >

## 2019-05-07 NOTE — PROGRESS NOTE ADULT - SUBJECTIVE AND OBJECTIVE BOX
Manish Milian [phone]    Patient is a 89y old  Male who presents with a chief complaint of RUQ pain (06 May 2019 16:38)    INTERVAL HPI/OVERNIGHT EVENTS:  Afebrile, saturating well overnight. NAEO.      MEDICATIONS  (STANDING):  amoxicillin  875 milliGRAM(s)/clavulanate 1 Tablet(s) Oral every 12 hours  apixaban 2.5 milliGRAM(s) Oral every 12 hours  atorvastatin 10 milliGRAM(s) Oral at bedtime    MEDICATIONS  (PRN):  HYDROmorphone   Tablet 1 milliGRAM(s) Oral every 6 hours PRN Severe Pain (7 - 10)    PAST MEDICAL & SURGICAL HISTORY:  Bradycardia  CVA (Cerebral Infarction): No residual signs on exam  GERD (Gastroesophageal Reflux Disease)  History of Carotid Stenosis  Hypercholesteremia  Hypertension  Pacemaker  Pacemaker  IH (Inguinal Hernia): Left  History of Knee Surgery: Left knee in 1970&#x27;s      Vital Signs Last 24 Hrs  T(C): 36.4 (07 May 2019 04:50), Max: 37.2 (06 May 2019 22:06)  T(F): 97.5 (07 May 2019 04:50), Max: 99 (06 May 2019 22:06)  HR: 67 (07 May 2019 04:50) (67 - 78)  BP: 130/64 (07 May 2019 04:50) (113/61 - 130/64)  BP(mean): --  RR: 16 (07 May 2019 04:50) (16 - 17)  SpO2: 97% (07 May 2019 04:50) (96% - 97%)      In's and Outs:   05-06-19 @ 07:01  -  05-07-19 @ 07:00  --------------------------------------------------------  IN: 0 mL / OUT: 200 mL / NET: -200 mL    UO:   05-06-19 @ 07:01  -  05-07-19 @ 07:00  --------------------------------------------------------  IN: 0 mL/kg/hr / OUT: 0.13 mL/kg/hr / NET: -0.13 mL/kg/hr      PHYSICAL EXAMINATION:  GENERAL: The patient is awake and alert in no apparent distress.   HEENT: NCAT. EOMI. Mucous membranes are moist.  NECK: Supple. No JVD.  LUNGS: [x] Clear to auscultation b/l   [x] Unlabored breathing   [ ] Wheezing  [ ] Rales  [ ] Rhonchi  HEART: [x] Normal  [ ] Irregular rhythm  [ ] Tachycardia  [ ] Bradycardia   [ ] Systolic murmur  [ ] Diastolic murmur  [ ] Gallop   ABDOMEN: [x] Normal  [ ] Hard  [ ] Tender  [ ] Distended [ ] Bowel sounds  GENITOURINARY: [x] Normal  [ ] Incontinent   [ ] Oliguria/Anuria   [ ] Beebe  EXTREMITIES: [x] Normal  [ ] Cyanosis  [ ] Edema  NEUROLOGIC: [x] Grossly intact  [ ] Focal neurologic deficits  SKIN: [x] Normal  [ ] Rash   [ ] Ulcers  Musculoskeletal:  [x] Normal   [ ] Generalized weakness  [ ] Bedbound      LABS:                        13.6   16.81 )-----------( 382      ( 06 May 2019 05:45 )             40.5     Hgb Trend: 13.6<--, 13.4<--, 14.4<--, 14.3<--, 14.6<--  05-06    133<L>  |  101  |  19  ----------------------------<  128<H>  3.5   |  21<L>  |  0.86    Ca    8.2<L>      06 May 2019 05:45  Phos  3.1     05-06  Mg     2.0     05-06    Creatinine Trend: 0.86<--, 0.84<--, 0.81<--, 0.84<--, 0.88<--, 1.02<--    MICROBIOLOGY:  Blood cultures:   (collected 04-30-19 @ 10:42)  Source: BLOOD VENOUS     (collected 04-30-19 @ 10:42)  Source: BLOOD PERIPHERAL Manish Milian [phone]    Patient is a 89y old  Male who presents with a chief complaint of RUQ pain (06 May 2019 16:38)    INTERVAL HPI/OVERNIGHT EVENTS:  Afebrile, saturating well overnight. NAEO. This AM c/o some soreness in epigastric area but otherwise has no complaints. Does not want any pain medicine for his soreness. Drain at bedside with ~25cc of serous fluid.      MEDICATIONS  (STANDING):  amoxicillin  875 milliGRAM(s)/clavulanate 1 Tablet(s) Oral every 12 hours  apixaban 2.5 milliGRAM(s) Oral every 12 hours  atorvastatin 10 milliGRAM(s) Oral at bedtime    MEDICATIONS  (PRN):  HYDROmorphone   Tablet 1 milliGRAM(s) Oral every 6 hours PRN Severe Pain (7 - 10)    PAST MEDICAL & SURGICAL HISTORY:  Bradycardia  CVA (Cerebral Infarction): No residual signs on exam  GERD (Gastroesophageal Reflux Disease)  History of Carotid Stenosis  Hypercholesteremia  Hypertension  Pacemaker  Pacemaker  IH (Inguinal Hernia): Left  History of Knee Surgery: Left knee in 1970&#x27;s      Vital Signs Last 24 Hrs  T(C): 36.4 (07 May 2019 04:50), Max: 37.2 (06 May 2019 22:06)  T(F): 97.5 (07 May 2019 04:50), Max: 99 (06 May 2019 22:06)  HR: 67 (07 May 2019 04:50) (67 - 78)  BP: 130/64 (07 May 2019 04:50) (113/61 - 130/64)  BP(mean): --  RR: 16 (07 May 2019 04:50) (16 - 17)  SpO2: 97% (07 May 2019 04:50) (96% - 97%)      In's and Outs:   05-06-19 @ 07:01  -  05-07-19 @ 07:00  --------------------------------------------------------  IN: 0 mL / OUT: 200 mL / NET: -200 mL    UO:   05-06-19 @ 07:01  -  05-07-19 @ 07:00  --------------------------------------------------------  IN: 0 mL/kg/hr / OUT: 0.13 mL/kg/hr / NET: -0.13 mL/kg/hr      PHYSICAL EXAMINATION:  GENERAL: The patient is awake and alert in no apparent distress. +[place].  HEENT: NCAT. EOMI. Mucous membranes are dry.  NECK: Supple. No JVD.  LUNGS: [x] Clear to auscultation b/l   [x] Unlabored breathing   [ ] Wheezing  [ ] Rales  [ ] Rhonchi  HEART: [x] Normal  [ ] Irregular rhythm  [ ] Tachycardia  [ ] Bradycardia   [ ] Systolic murmur  [ ] Diastolic murmur  [ ] Gallop   ABDOMEN: [ ] Normal  [ ] Hard  [ ] Tender  [ ] Distended [ ] Bowel sounds [x] Biliary drain in place without surrounding tenderness. Dressing c/d/i. +serous fluid ~25cc in biliary drain bag.  GENITOURINARY: [ ] Normal  [ ] Incontinent   [ ] Oliguria/Anuria   [ ] Beebe  EXTREMITIES: [x] Normal  [ ] Cyanosis  [ ] Edema  NEUROLOGIC: [x] Grossly intact  [ ] Focal neurologic deficits  SKIN: [x] Normal  [ ] Rash   [ ] Ulcers  Musculoskeletal:  [x] Normal   [ ] Generalized weakness  [ ] Bedbound    LABS:                        13.6   16.81 )-----------( 382      ( 06 May 2019 05:45 )             40.5     Hgb Trend: 13.6<--, 13.4<--, 14.4<--, 14.3<--, 14.6<--  05-06    133<L>  |  101  |  19  ----------------------------<  128<H>  3.5   |  21<L>  |  0.86    Ca    8.2<L>      06 May 2019 05:45  Phos  3.1     05-06  Mg     2.0     05-06    Creatinine Trend: 0.86<--, 0.84<--, 0.81<--, 0.84<--, 0.88<--, 1.02<--    MICROBIOLOGY:  Blood cultures:   (collected 04-30-19 @ 10:42)  Source: BLOOD VENOUS     (collected 04-30-19 @ 10:42)  Source: BLOOD PERIPHERAL

## 2019-05-08 ENCOUNTER — CHART COPY (OUTPATIENT)
Age: 84
End: 2019-05-08

## 2019-05-08 LAB — SPECIMEN SOURCE: SIGNIFICANT CHANGE UP

## 2019-06-04 LAB — FUNGUS SPEC QL CULT: SIGNIFICANT CHANGE UP

## 2019-07-15 ENCOUNTER — EMERGENCY (EMERGENCY)
Facility: HOSPITAL | Age: 84
LOS: 1 days | Discharge: HOME | End: 2019-07-15
Attending: EMERGENCY MEDICINE
Payer: MEDICARE

## 2019-07-15 VITALS
SYSTOLIC BLOOD PRESSURE: 132 MMHG | RESPIRATION RATE: 18 BRPM | DIASTOLIC BLOOD PRESSURE: 76 MMHG | HEART RATE: 60 BPM | OXYGEN SATURATION: 97 %

## 2019-07-15 VITALS
HEIGHT: 65 IN | RESPIRATION RATE: 18 BRPM | OXYGEN SATURATION: 99 % | WEIGHT: 130.07 LBS | SYSTOLIC BLOOD PRESSURE: 123 MMHG | TEMPERATURE: 98 F | DIASTOLIC BLOOD PRESSURE: 76 MMHG | HEART RATE: 67 BPM

## 2019-07-15 LAB
ALBUMIN SERPL ELPH-MCNC: 3.5 G/DL — SIGNIFICANT CHANGE UP (ref 3.3–5)
ALP SERPL-CCNC: 591 U/L — HIGH (ref 40–120)
ALT FLD-CCNC: 159 U/L — HIGH (ref 10–45)
ANION GAP SERPL CALC-SCNC: 16 MMOL/L — SIGNIFICANT CHANGE UP (ref 5–17)
APTT BLD: 26.7 SEC — LOW (ref 27.5–36.3)
AST SERPL-CCNC: 121 U/L — HIGH (ref 10–40)
BASOPHILS # BLD AUTO: 0 K/UL — SIGNIFICANT CHANGE UP (ref 0–0.2)
BASOPHILS NFR BLD AUTO: 0.2 % — SIGNIFICANT CHANGE UP (ref 0–2)
BILIRUB SERPL-MCNC: 28.9 MG/DL — HIGH (ref 0.2–1.2)
BUN SERPL-MCNC: 16 MG/DL — SIGNIFICANT CHANGE UP (ref 7–23)
CALCIUM SERPL-MCNC: 9.4 MG/DL — SIGNIFICANT CHANGE UP (ref 8.4–10.5)
CHLORIDE SERPL-SCNC: 91 MMOL/L — LOW (ref 96–108)
CO2 SERPL-SCNC: 21 MMOL/L — LOW (ref 22–31)
CREAT SERPL-MCNC: 0.6 MG/DL — SIGNIFICANT CHANGE UP (ref 0.5–1.3)
EOSINOPHIL # BLD AUTO: 0.1 K/UL — SIGNIFICANT CHANGE UP (ref 0–0.5)
EOSINOPHIL NFR BLD AUTO: 0.4 % — SIGNIFICANT CHANGE UP (ref 0–6)
GLUCOSE SERPL-MCNC: 152 MG/DL — HIGH (ref 70–99)
HCT VFR BLD CALC: 39.2 % — SIGNIFICANT CHANGE UP (ref 39–50)
HGB BLD-MCNC: 13.5 G/DL — SIGNIFICANT CHANGE UP (ref 13–17)
INR BLD: 1.13 RATIO — SIGNIFICANT CHANGE UP (ref 0.88–1.16)
LYMPHOCYTES # BLD AUTO: 0.8 K/UL — LOW (ref 1–3.3)
LYMPHOCYTES # BLD AUTO: 4.7 % — LOW (ref 13–44)
MCHC RBC-ENTMCNC: 31.5 PG — SIGNIFICANT CHANGE UP (ref 27–34)
MCHC RBC-ENTMCNC: 34.4 GM/DL — SIGNIFICANT CHANGE UP (ref 32–36)
MCV RBC AUTO: 91.6 FL — SIGNIFICANT CHANGE UP (ref 80–100)
MONOCYTES # BLD AUTO: 1.1 K/UL — HIGH (ref 0–0.9)
MONOCYTES NFR BLD AUTO: 6.3 % — SIGNIFICANT CHANGE UP (ref 2–14)
NEUTROPHILS # BLD AUTO: 14.9 K/UL — HIGH (ref 1.8–7.4)
NEUTROPHILS NFR BLD AUTO: 88.4 % — HIGH (ref 43–77)
PLATELET # BLD AUTO: 282 K/UL — SIGNIFICANT CHANGE UP (ref 150–400)
POTASSIUM SERPL-MCNC: 3.5 MMOL/L — SIGNIFICANT CHANGE UP (ref 3.5–5.3)
POTASSIUM SERPL-SCNC: 3.5 MMOL/L — SIGNIFICANT CHANGE UP (ref 3.5–5.3)
PROT SERPL-MCNC: 6 G/DL — SIGNIFICANT CHANGE UP (ref 6–8.3)
PROTHROM AB SERPL-ACNC: 13 SEC — HIGH (ref 10–12.9)
RBC # BLD: 4.28 M/UL — SIGNIFICANT CHANGE UP (ref 4.2–5.8)
RBC # FLD: 16.5 % — HIGH (ref 10.3–14.5)
SODIUM SERPL-SCNC: 128 MMOL/L — LOW (ref 135–145)
WBC # BLD: 16.9 K/UL — HIGH (ref 3.8–10.5)
WBC # FLD AUTO: 16.9 K/UL — HIGH (ref 3.8–10.5)

## 2019-07-15 PROCEDURE — 74176 CT ABD & PELVIS W/O CONTRAST: CPT

## 2019-07-15 PROCEDURE — 47536 EXCHANGE BILIARY DRG CATH: CPT

## 2019-07-15 PROCEDURE — 85027 COMPLETE CBC AUTOMATED: CPT

## 2019-07-15 PROCEDURE — C1887: CPT

## 2019-07-15 PROCEDURE — 74176 CT ABD & PELVIS W/O CONTRAST: CPT | Mod: 26

## 2019-07-15 PROCEDURE — 99284 EMERGENCY DEPT VISIT MOD MDM: CPT | Mod: 25

## 2019-07-15 PROCEDURE — 47536 EXCHANGE BILIARY DRG CATH: CPT | Mod: 53

## 2019-07-15 PROCEDURE — 85730 THROMBOPLASTIN TIME PARTIAL: CPT

## 2019-07-15 PROCEDURE — 99284 EMERGENCY DEPT VISIT MOD MDM: CPT | Mod: GC

## 2019-07-15 PROCEDURE — C1729: CPT

## 2019-07-15 PROCEDURE — C1769: CPT

## 2019-07-15 PROCEDURE — 85610 PROTHROMBIN TIME: CPT

## 2019-07-15 PROCEDURE — 80053 COMPREHEN METABOLIC PANEL: CPT

## 2019-07-15 RX ORDER — SODIUM CHLORIDE 9 MG/ML
500 INJECTION INTRAMUSCULAR; INTRAVENOUS; SUBCUTANEOUS ONCE
Refills: 0 | Status: COMPLETED | OUTPATIENT
Start: 2019-07-15 | End: 2019-07-15

## 2019-07-15 RX ORDER — SODIUM CHLORIDE 9 MG/ML
1000 INJECTION, SOLUTION INTRAVENOUS ONCE
Refills: 0 | Status: COMPLETED | OUTPATIENT
Start: 2019-07-15 | End: 2019-07-15

## 2019-07-15 RX ADMIN — SODIUM CHLORIDE 1000 MILLILITER(S): 9 INJECTION, SOLUTION INTRAVENOUS at 09:40

## 2019-07-15 NOTE — PROGRESS NOTE ADULT - SUBJECTIVE AND OBJECTIVE BOX
Interventional Radiology     89y Male with PMH as below with Gallbladder cancer and h/o cholecystostomy catheter that became dislodged this morning.  As per d/w patient's daughter the cholecystostomy catheter was placed for decompression of gallbladder for comfort and relief of pain as pt is on home hospice care.      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  No significant past surgical history    Allergies  No Known Allergies    Labs:                        13.5   16.9  )-----------( 282      ( 15 Jul 2019 08:50 )             39.2     07-15    128<L>  |  91<L>  |  16  ----------------------------<  152<H>  3.5   |  21<L>  |  0.60    Ca    9.4      15 Jul 2019 08:50    TPro  6.0  /  Alb  3.5  /  TBili  28.9<H>  /  DBili  x   /  AST  121<H>  /  ALT  159<H>  /  AlkPhos  591<H>  07-15    PT/INR - ( 15 Jul 2019 08:50 )   PT: 13.0 sec;   INR: 1.13 ratio    PTT - ( 15 Jul 2019 08:50 )  PTT:26.7 sec    After risks, benefits, alternatives discussion with the patient's daughter, Mireille Delatorre, she verbalized understanding and signed informed consent.  The DNR was addressed as is noted on the consent form with DNR suspension.  Will plan for cholecystostomy tube replacement and possible new cholecystostomy placement.     Ramez Lewis, SARY-C  [facility] [id number]  Ext [phone] Interventional Radiology     89y Male with PMH as below with Gallbladder cancer and h/o cholecystostomy catheter that became dislodged this morning.  Cholecystostomy catheter was placed on 5/3/19 by Dr. Guevara Tamayo (8.5 F pigtail catheter) for acute cholecystitis with possible perforation.      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  No significant past surgical history    Allergies  No Known Allergies    Labs:                        13.5   16.9  )-----------( 282      ( 15 Jul 2019 08:50 )             39.2     07-15    128<L>  |  91<L>  |  16  ----------------------------<  152<H>  3.5   |  21<L>  |  0.60    Ca    9.4      15 Jul 2019 08:50    TPro  6.0  /  Alb  3.5  /  TBili  28.9<H>  /  DBili  x   /  AST  121<H>  /  ALT  159<H>  /  AlkPhos  591<H>  07-15    PT/INR - ( 15 Jul 2019 08:50 )   PT: 13.0 sec;   INR: 1.13 ratio    PTT - ( 15 Jul 2019 08:50 )  PTT:26.7 sec    After risks, benefits, alternatives discussion with the patient's daughter, Mireille Delatorre, she verbalized understanding and signed informed consent.  The DNR was addressed as is noted on the consent form with DNR suspension.  Will plan for cholecystostomy tube replacement and possible new cholecystostomy placement.     Ramez Lewis, SARY-C  [facility] [address]  Ext [phone]

## 2019-07-15 NOTE — ED PROVIDER NOTE - OBJECTIVE STATEMENT
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 Has been eating less for 2 weeks, has not had anything to eat for the past 2 days. Has minimal stools, not vomiting. Not confused this is his baseline. No F/C.  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 Has been eating less for 2 weeks, has not had anything to eat for the past 2 days. Has minimal stools, not vomiting. Not confused this is his baseline. No F/C.  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 Has been eating less for 2 weeks, has not had anything to eat for the past 2 days. Has minimal stools, not vomiting. Not confused this is his baseline. No F/C.  States pt has been becoming   Surgeon is Maicol at [facility], PCP is Efren Johnston w/ [facility]    Med: Eliquis 2.5mg BID, folic acid

## 2019-07-15 NOTE — ED ADULT NURSE NOTE - OBJECTIVE STATEMENT
89 year old male PMH of gallbladder ca with mets to the liver presents to ED by EMS accompanied by daughters who said biliary drain fell out today.  Patient is not currently on treatment for ca, but had a biliary drain placed in April and his daughter changes his dressings and went to do so today and the drain fell out. Per family,. patient is not eating, but is tolerating po liquids and making urine and had a BM last week.  Patient denies pain.  L/s clear to auscultation, but diminished in the lower lobes, abdomen is soft and non-tender on exam, skin is jaundiced, drain site is clean dry and no drainage noted from site.  Bed in lowest position.

## 2019-07-15 NOTE — ED PROVIDER NOTE - NSFOLLOWUPINSTRUCTIONS_ED_ALL_ED_FT
(1) Follow up with your primary care physician as discussed.   (2) Immediately seek care at your nearest emergency room if your worsen, persist, or do not resolve

## 2019-07-15 NOTE — ED PROVIDER NOTE - PHYSICAL EXAMINATION
General: NAD, grossly jaundiced   HEENT: pupils equal and reactive, normal oropharynx, normal external ears bilaterally   Cardiac: RRR, no MRG apprieciated  Resp: lungs clear to auscultation bilaterally, symmetric chest wall rise  Abd: mass apprieciated over RUQ, circular wound lesion over RUQ, ontender, nondistended, normoactive bowel sounds  : no CVA tenderness  Neuro: Moving all extremities, hard of hearing however alert and oriented   Skin:  Grossly diffusely jaundiced

## 2019-07-15 NOTE — ED ADULT NURSE REASSESSMENT NOTE - NS ED NURSE REASSESS COMMENT FT1
Patient and family updated on plan that patient still waiting for a time from IR.  IR was contacted and no time given.

## 2019-07-15 NOTE — ED PROVIDER NOTE - CLINICAL SUMMARY MEDICAL DECISION MAKING FREE TEXT BOX
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 DNR/DNI on hospice care, however grossly jaundiced in context of Percutaneous choly drain out, will call IR to see if it can be replaced, labs, reassess. Daughters/pt stating would not want admission. 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 DNR/DNI on hospice care, however grossly jaundiced in context of Percutaneous choly drain out, will call IR to see if it can be replaced, labs, reassess. Daughters/pt stating would not want admission.

## 2019-07-15 NOTE — ED PROVIDER NOTE - PROGRESS NOTE DETAILS
Jimmy PGY-2:  D/W pt and family abnormal lab results and likely dire/critical prognosis, family understands has already had extensive discussions and does not want admission at this time for further workup/intervention, and wants pt to be comfortable at home. Will d/c

## 2019-10-29 NOTE — H&P ADULT - SKIN/BREAST
Please return here or go to the Emergency Department for any concerns or worsening of condition.  If you were prescribed antibiotics, please take them to completion.  If you were prescribed a narcotic medication, do not drive or operate heavy equipment or machinery while taking these medications.  Please follow up with your primary care doctor or specialist as needed.    If you  smoke, please stop smoking.    Self-Care for Skin Rashes     Pat your skin dry. Do not rub.     When your skin reacts to a substance your body is sensitive to, it can cause a rash. You can treat most rashes at home by keeping the skin clean and dry. Many rashes may get better on their own within 2 to 3 days. You may need medical attention if your rash itches, drains, or hurts, particularly if the rash is getting worse.  What can cause a skin rash?  · Sun poisoning, caused by too much exposure to the sun  · An irritant or allergic reaction to a certain type of food, plant, or chemical, such as  shellfish, poison ivy, and or cleaning products  · An infection caused by a fungus (ringworm), virus (chickenpox), or bacteria (strep)  · Bites or infestation caused by insects or pests, such as ticks, lice, or mites  · Dry skin, which is often seen during the winter months and in older people  How can I control itching and skin damage?  · Take soothing lukewarm baths in a colloidal oatmeal product. You can buy this at the [employer].  · Do your best not to scratch. Clip fingernails short, especially in young children, to reduce skin damage if scratching does occur.  · Use moisturizing skin lotion instead of scratching your dry skin.  · Use sunscreen whenever going out into direct sun.  · Use only mild cleansing agents whenever possible.  · Wash with mild, nonirritating soap and warm water.  · Wear clothing that breathes, such as cotton shirts or canvas shoes.  · If fluid is seeping from the rash, cover it loosely with clean gauze to absorb the  discharge.  · Many rashes are contagious. Prevent the rash from spreading to others by washing your hands often before or after touching others with any skin rash.  Use medicine  · Antihistamines such as diphenhydramine can help control itching. But use with caution because they can make you drowsy.  · Using over-the-counter hydrocortisone cream on small rashes may help reduce swelling and itching  · Most over-the-counter antifungal medicines can treat athletes foot and many other fungal infections of the skin.  Check with your healthcare provider  Call your healthcare provider if:  · You were told that you have a fungal infection on your skin to make sure you have the correct type of medicine.  · You have questions or concerns about medicines or their side effects.     Call 911  Call 911 if either of these occur:  · Your tongue or lips start to swell  · You have difficulty breathing      Call your healthcare provider  Call your healthcare provider if any of these occur:  · Temperature of more than 101.0°F (38.3°C), or as directed  · Sore throat, a cough, or unusual fatigue  · Red, oozy, or painful rash gets worse. These are signs of infection.  · Rash covers your face, genitals, or most of your body  · Crusty sores or red rings that begin to spread  · You were exposed to someone who has a contagious rash, such as scabies or lice.  · Red bulls-eye rash with a white center (a sign of Lyme disease)  · You were told that you have resistant bacteria (MRSA) on your skin.   Date Last Reviewed: 5/12/2015  © 3549-2414 [employer]. [address] [place] [address], [place] [address]. All rights reserved. This information is not intended as a substitute for professional medical care. Always follow your healthcare professional's instructions.         details…

## 2022-06-15 NOTE — PATIENT PROFILE ADULT - BRADEN SENSORY
I agree with the above with the following additions and exceptions:    Tolerating CLD. 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  -hold diuretics per renal    Dispo: d/c home shahrzad or later today if tolerates regular diet and cleared by renal (4) no impairment

## 2022-10-11 NOTE — ED ADULT NURSE NOTE - INTERVENTIONS DEFINITIONS
Provide visual cue, wrist band, yellow gown, etc./Physically safe environment: no spills, clutter or unnecessary equipment/Stretcher in lowest position, wheels locked, appropriate side rails in place Doxycycline Counseling:  Patient counseled regarding possible photosensitivity and increased risk for sunburn.  Patient instructed to avoid sunlight, if possible.  When exposed to sunlight, patients should wear protective clothing, sunglasses, and sunscreen.  The patient was instructed to call the office immediately if the following severe adverse effects occur:  hearing changes, easy bruising/bleeding, severe headache, or vision changes.  The patient verbalized understanding of the proper use and possible adverse effects of doxycycline.  All of the patient's questions and concerns were addressed.

## 2023-02-23 NOTE — CONSULT NOTE ADULT - SUBJECTIVE AND OBJECTIVE BOX
HPI: 89 yo man with HTN, prior CVA on Eliquis, sinus node dysfunction s/p PPM presenting from home with RUQ abdominal pain. He developed nausea two days prior to presentation, then subsequently developed RUQ pain that worsened overnight. He denies fever, chills or diarrhea. He has never had pain like this previously. He does not have an appetite today, but usually he has a good appetite and denies recent weight loss. His last colonoscopy was greater than a decade ago.     In the ED, patient is afebrile and hemodynamically normal. He has a mild leukocytosis and an elevated Tbili of 1.7. He underwent a CT that showed necrotic peripancreatic and RP lymphadenopathy, a heterogenous L adrenal mass, omental nodularity with pelvic ascites and diffuse thickening of the gallbladder with discontinuity of the wall concerning for perforation.     Otherwise, patient is in good health; he participates in all his ADL and walks his dog for 1/2 hour per day. Patient remains engaged in social activities -- he still goes to [employer]. Of note, patient's PCP is Dr. Efren Johnston.     PMHx: HTN, HLD, CVA (no residual deficits), Symptomatic sinus node dysfunction s/p PPM (2010)    PSHx: cataract surgery      Medications (home): Eliquis 2.5 BID, Carvedilol 12.5 BID, lisinopril 5mg daily, pravastatin 10mg daily     Allergies: No Known Allergies  (Intolerances: )    Social Hx: Remote history of smoking (greater than 60 years ago). Occassionally drinks wine. Lives with his daughter.     Physical Exam  T(C): 36.8 (04-30-19 @ 10:59)  HR: 90 (04-30-19 @ 10:59) (90 - 100)  BP: 105/67 (04-30-19 @ 10:59) (91/55 - 109/72)  RR: 18 (04-30-19 @ 10:59) (18 - 18)  SpO2: 100% (04-30-19 @ 10:59) (99% - 100%)  Tmax: T(C): , Max: 37 (04-30-19 @ 06:05)    General: well developed, well nourished, NAD  Neuro: alert and oriented, no focal deficits, moves all extremities spontaneously  HEENT: NCAT, EOMI, anicteric, mucosa moist. Hard of hearing.   Respiratory: airway patent, respirations unlabored  CVS: regular rate and rhythm  Abdomen: soft, moderate RUQ tenderness, nondistended. No abdominal scars.   Extremities: no edema, sensation and movement grossly intact  Skin: warm, dry, appropriate color    Labs:                        14.4   11.13 )-----------( 247      ( 30 Apr 2019 07:20 )             42.8     PT/INR - ( 30 Apr 2019 09:30 )   PT: 15.3 SEC;   INR: 1.37          PTT - ( 30 Apr 2019 09:30 )  PTT:25.1 SEC  04-30    133<L>  |  96<L>  |  31<H>  ----------------------------<  191<H>  3.8   |  18<L>  |  0.94    Ca    9.3      30 Apr 2019 07:20    TPro  6.9  /  Alb  3.4  /  TBili  1.7<H>  /  DBili  x   /  AST  33  /  ALT  25  /  AlkPhos  59  04-30    Urinalysis Basic - ( 30 Apr 2019 09:38 )    Color: YELLOW / Appearance: CLEAR / SG: > 1.040 / pH: 6.5  Gluc: NEGATIVE / Ketone: MODERATE  / Bili: TRACE / Urobili: SMALL   Blood: SMALL / Protein: 200 / Nitrite: NEGATIVE   Leuk Esterase: NEGATIVE / RBC: 11-25 / WBC 11-25   Sq Epi: FEW / Non Sq Epi: x / Bacteria: NEGATIVE    Imaging and other studies:  < from: CT Abdomen and Pelvis w/ IV Cont (04.30.19 @ 09:01) >    EXAM:  CT ABDOMEN AND PELVIS IC        *** ADDENDUM 04/30/2019  ***    CLINICAL INFORMATION: Right lower quadrant abdominal pain. Evaluate for   appendicitis. Please note, the following report pertains to the abdominal   and pelvic CT performed on 4/30/2019 at 8:58 AM. The addendum dictated   below by Dr. Jesus Parker related to the abdominal ultrasound performed   earlier on the same day.    COMPARISON: Correlation is made with abdominal ultrasound dated 4/30/2019   at 7:43 AM.    PROCEDURE:   CT of the Abdomen and Pelvis was performed with intravenous contrast.   Intravenous contrast: 90 ml Omnipaque 350. 10 ml discarded.  Oral contrast: None.  Sagittal and coronal reformats were performed.    FINDINGS:    LOWER CHEST: Cardiomegaly. Pacemaker. Trace right pleural effusion.   Bibasilar atelectasis.    LIVER: Periportal edema. Subcentimeter hypodense foci in the right and   caudate lobes, too small to characterize.  BILE DUCTS: Mild intrahepatic biliary ductal dilatation.  GALLBLADDER: Diffuse wall thickening. There are are multiple   low-attenuation collections adjacent to the gallbladder, particularly at   the inferior aspect of the gallbladder, where there is questionable   discontinuity of the wall (602:23 and 601:41)..  SPLEEN: Within normal limits.  PANCREAS: Within normal limits.  ADRENALS: Heterogeneous left adrenal mass, measuring 2.8 x 2.6 cm (2:38).   A 1 cm hypodense right adrenal nodule is also seen.  KIDNEYS/URETERS: Within normal limits.    BLADDER: Trabeculated bladder wall, with several diverticuli.  REPRODUCTIVE ORGANS: Prostate is enlarged..     BOWEL: No bowel obstruction. Appendix is within normal limits.Small to   moderate hiatal hernia. Colonic diverticulosis. Wall thickening of the   ascending and proximal transverse colon, adjacent to the abnormal   gallbladder.  PERITONEUM: Small volume perihepatic and pelvic ascites. Diffuse   nodularity of the omentum in the right upper quadrant.  Mildly enlarged   mesenteric lymph nodes.  VESSELS:  Atherosclerotic calcification.  RETROPERITONEUM: Necrotic peripancreatic, periportal, and retroperitoneal   lymphadenopathy. For example, a necrotic peripancreatic lymph node (2:43)   measures 2.1 x 1.7 cm.    ABDOMINAL WALL: Right inguinal hernia, containing a 1.8 x 1.5 cm   low-attenuation structure with peripheral enhancing wall, which may   represent loculated fluid versus a necrotic mass.  BONES: Degenerative changes in the spine.    IMPRESSION:     Diffuse wall thickening of the gallbladder, with apparent discontinuity   at the inferior aspect of the gallbladder and several low-attenuation   collections adjacent to the inferior gallbladder, concerning for   perforation.  A perforated gallbladder neoplasm is considered,   particularly in view of theextensive necrotic retroperitoneal   lymphadenopathy, bilateral adrenal masses, and nodularity of the omentum   in the right upper quadrant, which is suspicious for peritoneal   carcinomatosis.    Normal appendix.    Colonic diverticulosis.    These findings were discussed with Dr. Gross on 4/30/2019 at 9:30 AM by   Dr. Bunn with read back confirmation.        *** END OF ADDENDUM 04/30/2019  ***      PROCEDURE DATE:  Apr 30 2019         INTERPRETATION:  Correction to initial report: There is trace   pericholecystic fluid and perihepatic ascites, suspicious for acute   cholecystitis.    Dr. Parker discussed the findings with Dr. Amaya on April 30, 2019 at 9:12   AM.  Readback was obtained.          ***Please see the addendum at the top of this report. It may contain   additional important information or changes.****          JESUS PARKER M.D., ATTENDING RADIOLOGIST  This document has been electronically signed. Apr 30 2019  9:12AM  Addend:  LOUISE BUNN M.D., ATTENDING RADIOLOGIST  This addendum was electronically signed on: Apr 30 2019  9:55AM.            < end of copied text > [place] Counseling: I discussed with the patient the risks of risankizumab-rzaa including but not limited to immunosuppression, and serious infections.  The patient understands that monitoring is required including a PPD at baseline and must alert us or the primary physician if symptoms of infection or other concerning signs are noted.

## 2023-11-22 NOTE — PROVIDER CONTACT NOTE (OTHER) - DATE AND TIME:
The skin at the access site was anesthetized. Using a micropuncture needle with ultrasound ([employer]) the right radial artery was succesfully accessed using a NEEDLE PROC 21GA 2.5CM THNWL WO BSPLT STRL DISP PERC VASC. Ultrasound found the vessel was patent. 01-May-2019 05:30
